# Patient Record
Sex: MALE | Race: BLACK OR AFRICAN AMERICAN | NOT HISPANIC OR LATINO | Employment: FULL TIME | URBAN - METROPOLITAN AREA
[De-identification: names, ages, dates, MRNs, and addresses within clinical notes are randomized per-mention and may not be internally consistent; named-entity substitution may affect disease eponyms.]

---

## 2019-06-21 ENCOUNTER — HOSPITAL ENCOUNTER (EMERGENCY)
Facility: HOSPITAL | Age: 39
Discharge: HOME | End: 2019-06-21
Attending: EMERGENCY MEDICINE
Payer: MEDICAID

## 2019-06-21 VITALS
DIASTOLIC BLOOD PRESSURE: 96 MMHG | TEMPERATURE: 99 F | OXYGEN SATURATION: 98 % | SYSTOLIC BLOOD PRESSURE: 198 MMHG | HEART RATE: 63 BPM | RESPIRATION RATE: 18 BRPM | HEIGHT: 74 IN | WEIGHT: 315 LBS | BODY MASS INDEX: 40.43 KG/M2

## 2019-06-21 DIAGNOSIS — I10 ESSENTIAL HYPERTENSION: ICD-10-CM

## 2019-06-21 DIAGNOSIS — M27.2 HARD PALATE ABSCESS: ICD-10-CM

## 2019-06-21 DIAGNOSIS — K08.89 DENTALGIA: Primary | ICD-10-CM

## 2019-06-21 LAB — POCT GLUCOSE: 178 MG/DL (ref 70–110)

## 2019-06-21 PROCEDURE — 10060 I&D ABSCESS SIMPLE/SINGLE: CPT

## 2019-06-21 PROCEDURE — 99283 EMERGENCY DEPT VISIT LOW MDM: CPT | Mod: 25

## 2019-06-21 PROCEDURE — 63600175 PHARM REV CODE 636 W HCPCS: Performed by: EMERGENCY MEDICINE

## 2019-06-21 PROCEDURE — 42000 DRAINAGE MOUTH ROOF LESION: CPT

## 2019-06-21 PROCEDURE — 25000003 PHARM REV CODE 250: Performed by: EMERGENCY MEDICINE

## 2019-06-21 RX ORDER — HYDROCHLOROTHIAZIDE 12.5 MG/1
12.5 CAPSULE ORAL
Status: ON HOLD | COMMUNITY
Start: 2019-06-05 | End: 2020-03-08 | Stop reason: HOSPADM

## 2019-06-21 RX ORDER — HYDROCODONE BITARTRATE AND ACETAMINOPHEN 10; 325 MG/1; MG/1
1 TABLET ORAL
Status: COMPLETED | OUTPATIENT
Start: 2019-06-21 | End: 2019-06-21

## 2019-06-21 RX ORDER — AMLODIPINE BESYLATE 5 MG/1
5 TABLET ORAL
COMMUNITY
Start: 2019-06-05 | End: 2020-11-27

## 2019-06-21 RX ORDER — CLINDAMYCIN HYDROCHLORIDE 150 MG/1
300 CAPSULE ORAL 4 TIMES DAILY
Qty: 56 CAPSULE | Refills: 0 | Status: SHIPPED | OUTPATIENT
Start: 2019-06-21 | End: 2019-06-28

## 2019-06-21 RX ORDER — HYDROCODONE BITARTRATE AND ACETAMINOPHEN 7.5; 325 MG/1; MG/1
1 TABLET ORAL EVERY 6 HOURS PRN
Qty: 10 TABLET | Refills: 0 | Status: SHIPPED | OUTPATIENT
Start: 2019-06-21 | End: 2021-05-19

## 2019-06-21 RX ADMIN — BENZOCAINE: 200 SPRAY DENTAL; ORAL; PERIODONTAL at 08:06

## 2019-06-21 RX ADMIN — HYDROCODONE BITARTRATE AND ACETAMINOPHEN 1 TABLET: 10; 325 TABLET ORAL at 07:06

## 2019-06-21 NOTE — ED NOTES
Pt reports 8/10 mouth pain due to abscess on roof of mouth - onset x 2 days. Pt denies fever, nausea, vomiting.     Patient identifiers verified and correct for Tee Shrestha.    HEENT: Abscess to roof of mouth - left side.   LOC: The patient is awake, alert and aware of environment with an appropriate affect, the patient is oriented x 3 and speaking appropriately.  APPEARANCE: Patient resting comfortably and in no acute distress, patient is clean and well groomed, patient's clothing is properly fastened.  SKIN: The skin is warm and dry, color consistent with ethnicity, patient has normal skin turgor and moist mucus membranes, skin intact, no breakdown or bruising noted.  MUSCULOSKELETAL: Patient moving all extremities spontaneously.  RESPIRATORY: Airway is open and patent, respirations are spontaneous.  CARDIAC: Patient has a normal rate, no periphreal edema noted, capillary refill < 3 seconds.  ABDOMEN: Soft and non tender to palpation.

## 2019-06-21 NOTE — ED PROVIDER NOTES
SCRIBE #1 NOTE: I, Corinne Mack, am scribing for, and in the presence of, Darwin Vee MD. I have scribed the entire note.      History      Chief Complaint   Patient presents with    Dental Pain     Pt reports pain to left side of mouth with abcess to roof of mouth x2 days.       Review of patient's allergies indicates:   Allergen Reactions    Pcn [penicillins] Itching        HPI   HPI    6/21/2019, 7:19 AM   History obtained from the patient      History of Present Illness: Tee Shrestha is a 39 y.o. male patient with PMHx of GERD and HTN who presents to the Emergency Department for L upper dental pain which onset gradually 2 days ago. Pt states that he has an abscess to the roof of his mouth and that the pain feels like it radiates up towards his nose. Symptoms are constant and moderate in severity. Pt's pain is worse with palpation. No mitigating factors reported. Associated sxs include L sided facial pain and L ear pain. Patient denies any fever, chills, congestion, sinus pain, cough, N/V/D, abd pain, back pain, neck pain, HA, and all other sxs at this time. No prior Tx reported. Pt's BP is 218/105 in the ED. Pt states that he has not taken his daily medications, including his BP medication, yet today. No further complaints or concerns at this time.         Arrival mode: Personal vehicle    PCP: Bear Walker MD       Past Medical History:  Past Medical History:   Diagnosis Date    Abscess     GERD (gastroesophageal reflux disease)     Hypertension        Past Surgical History:  History reviewed. No pertinent surgical history.      Family History:  Family History   Problem Relation Age of Onset    Hypertension Mother     Diabetes Mother     No Known Problems Father        Social History:  Social History     Tobacco Use    Smoking status: Current Every Day Smoker     Packs/day: 1.00     Types: Cigarettes    Smokeless tobacco: Never Used   Substance and Sexual Activity    Alcohol use: No     Drug use: No    Sexual activity: Unknown       ROS   Review of Systems   Constitutional: Negative for chills and fever.   HENT: Positive for dental problem (L upper pain) and ear pain (L). Negative for congestion, ear discharge, facial swelling, mouth sores, rhinorrhea, sinus pressure, sinus pain and sore throat.         (+) L sided facial pain   Respiratory: Negative for cough and shortness of breath.    Cardiovascular: Negative for chest pain and leg swelling.   Gastrointestinal: Negative for abdominal pain, diarrhea, nausea and vomiting.   Musculoskeletal: Negative for back pain, neck pain and neck stiffness.   Skin: Negative for rash and wound.   Neurological: Negative for dizziness, light-headedness, numbness and headaches.   All other systems reviewed and are negative.    Physical Exam      Initial Vitals   BP Pulse Resp Temp SpO2   06/21/19 0710 06/21/19 0710 06/21/19 0720 06/21/19 0710 06/21/19 0710   (!) 218/105 70 18 99 °F (37.2 °C) 97 %      MAP       --                 Physical Exam  Nursing Notes and Vital Signs Reviewed.  Constitutional: Patient is in no acute distress. Well-developed and well-nourished.  Head: Atraumatic. Normocephalic.  Eyes: PERRL. EOM intact. Conjunctivae are not pale. No scleral icterus.  ENT: Mucous membranes are moist. Oropharynx is clear and symmetric.    Mouth/Throat: No evident facial swelling. Patient handles secretions normally. Positive for dental caries. Negative for gingival edema. There is a 1x1 cm area of fluctuance and TTP to the L hard palate behind the L incisor. No evidence of periodontal or periapical abscess. No trismus.   Neck: Supple. Full ROM. No lymphadenopathy.  Cardiovascular: Regular rate. Regular rhythm. No murmurs, rubs, or gallops. Distal pulses are 2+ and symmetric.  Pulmonary/Chest: No respiratory distress. Clear to auscultation bilaterally. No wheezing or rales.  Abdominal: Soft and non-distended.  There is no tenderness.  No rebound, guarding,  "or rigidity.  Musculoskeletal: Moves all extremities. No obvious deformities. No edema.   Skin: Warm and dry.  Neurological:  Alert, awake, and appropriate.  Normal speech.  No acute focal neurological deficits are appreciated.  Psychiatric: Normal affect. Good eye contact. Appropriate in content.    ED Course    I & D - Incision and Drainage  Date/Time: 2019 8:55 AM  Performed by: Chema Tirado NP  Authorized by: Darwin Vee MD   Consent Done: Yes  Consent: Verbal consent obtained.  Risks and benefits: risks, benefits and alternatives were discussed  Consent given by: patient  Patient understanding: patient states understanding of the procedure being performed  Patient consent: the patient's understanding of the procedure matches consent given  Patient identity confirmed: , name and verbally with patient  Type: abscess  Body area: mouth  Location details: palate  Anesthesia: see MAR for details    Anesthesia:  Local anesthetic: benzocaine 20% mouth spray.  Patient sedated: no  Scalpel size: 10  Incision type: single straight  Complexity: simple  Drainage: pus and  purulent  Drainage amount: moderate  Wound treatment: incision,  drainage and  wound left open  Complications: No  Specimens: No  Implants: No  Patient tolerance: Patient tolerated the procedure well with no immediate complications        ED Vital Signs:  Vitals:    19 0710 19 0720 19 0943   BP: (!) 218/105 (!) 198/95 (!) 198/96   Pulse: 70 74 63   Resp:  18 18   Temp: 99 °F (37.2 °C)     SpO2: 97% 98% 98%   Weight: (!) 181 kg (399 lb 0.5 oz)     Height: 6' 2" (1.88 m)         Abnormal Lab Results:  Labs Reviewed   POCT GLUCOSE - Abnormal; Notable for the following components:       Result Value    POCT Glucose 178 (*)     All other components within normal limits        All Lab Results:  Results for orders placed or performed during the hospital encounter of 19   POCT glucose   Result Value Ref Range    POCT Glucose " 178 (H) 70 - 110 mg/dL       Imaging Results:  Imaging Results    None                 The Emergency Provider reviewed the vital signs and test results, which are outlined above.    ED Discussion     9:07 AM: Reassessed pt at this time. Pt is awake, alert, and in no distress. Discussed with pt all pertinent ED information and results. Discussed pt dx and plan of tx. Gave pt all f/u and return to the ED instructions. All questions and concerns were addressed at this time. Pt expresses understanding of information and instructions, and is comfortable with plan to discharge. Pt is stable for discharge.    I discussed with patient and/or family/caretaker that evaluation in the ED does not suggest any emergent or life threatening medical conditions requiring immediate intervention beyond what was provided in the ED, and I believe patient is safe for discharge.  Regardless, an unremarkable evaluation in the ED does not preclude the development or presence of a serious of life threatening condition. As such, patient was instructed to return immediately for any worsening or change in current symptoms.    I discussed wound care precautions with patient and/or family/caretaker; specifically that all wounds have risk of infection despite efforts to cleanse and debride the wound; and there is a risk of an occult foreign body (and thus increased risk of infection) despite a negative examination.  I discussed with patient need to return for any signs of infection, specifically redness, increased pain, fever, drainage of pus, or any concern, immediately.      ED Medication(s):  Medications   benzocaine 20 % mouth spray ( Mouth/Throat Given by Other 6/21/19 6930)   HYDROcodone-acetaminophen  mg per tablet 1 tablet (1 tablet Oral Given 6/21/19 1873)          Medication List      START taking these medications    clindamycin 150 MG capsule  Commonly known as:  CLEOCIN  Take 2 capsules (300 mg total) by mouth 4 (four) times  daily. for 7 days     HYDROcodone-acetaminophen 7.5-325 mg per tablet  Commonly known as:  NORCO  Take 1 tablet by mouth every 6 (six) hours as needed for Pain.        ASK your doctor about these medications    amLODIPine 5 MG tablet  Commonly known as:  NORVASC     cetirizine 10 MG tablet  Commonly known as:  ZYRTEC  Take 1 tablet (10 mg total) by mouth once daily.     hydroCHLOROthiazide 12.5 mg capsule  Commonly known as:  MICROZIDE     ketorolac 10 mg tablet  Commonly known as:  TORADOL  Take 1 tablet (10 mg total) by mouth every 6 (six) hours.           Where to Get Your Medications      You can get these medications from any pharmacy    Bring a paper prescription for each of these medications  · clindamycin 150 MG capsule  · HYDROcodone-acetaminophen 7.5-325 mg per tablet         Follow-up Information     Bear Walker MD. Schedule an appointment as soon as possible for a visit in 3 days.    Specialty:  Family Medicine  Why:  Follow-up with your primary care physician the next 2-3 days for recheck.  Return to the emergency department for any worsening signs or symptoms.  Contact information:  [address] [place] [address] [phone]                     Medical Decision Making    Medical Decision Making:   Clinical Tests:   Lab Tests: Ordered and Reviewed     Additional MDM:   Smoking Cessation: The patient is a smoker. The patient was counseled on smoking cessation for: 4 minutes. The patient was counseled on tobacco related  health complications.        Scribe Attestation:   Scribe #1: I performed the above scribed service and the documentation accurately describes the services I performed. I attest to the accuracy of the note 06/21/2019.    Attending:   Physician Attestation Statement for Scribe #1: I, Darwin Vee MD, personally performed the services described in this documentation, as scribed by Corinne Mack, in my presence, and it is both accurate and complete.          Clinical Impression        ICD-10-CM ICD-9-CM   1. Dentalgia K08.89 525.9   2. Hard palate abscess M27.2 526.4   3. Essential hypertension I10 401.9       Disposition:   Disposition: Discharged  Condition: Stable           Darwin Vee MD  06/22/19 1036

## 2019-09-17 ENCOUNTER — HOSPITAL ENCOUNTER (EMERGENCY)
Facility: HOSPITAL | Age: 39
Discharge: HOME | End: 2019-09-17
Attending: EMERGENCY MEDICINE
Payer: MEDICAID

## 2019-09-17 VITALS
RESPIRATION RATE: 18 BRPM | DIASTOLIC BLOOD PRESSURE: 92 MMHG | HEART RATE: 87 BPM | SYSTOLIC BLOOD PRESSURE: 166 MMHG | WEIGHT: 315 LBS | HEIGHT: 74 IN | OXYGEN SATURATION: 98 % | BODY MASS INDEX: 40.43 KG/M2 | TEMPERATURE: 98 F

## 2019-09-17 DIAGNOSIS — R31.9 HEMATURIA, UNSPECIFIED TYPE: Primary | ICD-10-CM

## 2019-09-17 LAB
ALBUMIN SERPL BCP-MCNC: 3.3 G/DL (ref 3.5–5.2)
ALP SERPL-CCNC: 77 U/L (ref 55–135)
ALT SERPL W/O P-5'-P-CCNC: 13 U/L (ref 10–44)
ANION GAP SERPL CALC-SCNC: 10 MMOL/L (ref 8–16)
AST SERPL-CCNC: 13 U/L (ref 10–40)
BASOPHILS # BLD AUTO: 0.02 K/UL (ref 0–0.2)
BASOPHILS NFR BLD: 0.2 % (ref 0–1.9)
BILIRUB SERPL-MCNC: 0.5 MG/DL (ref 0.1–1)
BUN SERPL-MCNC: 8 MG/DL (ref 6–20)
CALCIUM SERPL-MCNC: 9.7 MG/DL (ref 8.7–10.5)
CHLORIDE SERPL-SCNC: 106 MMOL/L (ref 95–110)
CO2 SERPL-SCNC: 25 MMOL/L (ref 23–29)
CREAT SERPL-MCNC: 1 MG/DL (ref 0.5–1.4)
DIFFERENTIAL METHOD: ABNORMAL
EOSINOPHIL # BLD AUTO: 0.3 K/UL (ref 0–0.5)
EOSINOPHIL NFR BLD: 2.8 % (ref 0–8)
ERYTHROCYTE [DISTWIDTH] IN BLOOD BY AUTOMATED COUNT: 15.9 % (ref 11.5–14.5)
EST. GFR  (AFRICAN AMERICAN): >60 ML/MIN/1.73 M^2
EST. GFR  (NON AFRICAN AMERICAN): >60 ML/MIN/1.73 M^2
GLUCOSE SERPL-MCNC: 116 MG/DL (ref 70–110)
HCT VFR BLD AUTO: 43.4 % (ref 40–54)
HGB BLD-MCNC: 14.1 G/DL (ref 14–18)
HIV 1+2 AB+HIV1 P24 AG SERPL QL IA: NEGATIVE
LYMPHOCYTES # BLD AUTO: 2 K/UL (ref 1–4.8)
LYMPHOCYTES NFR BLD: 19.7 % (ref 18–48)
MCH RBC QN AUTO: 28.5 PG (ref 27–31)
MCHC RBC AUTO-ENTMCNC: 32.5 G/DL (ref 32–36)
MCV RBC AUTO: 88 FL (ref 82–98)
MONOCYTES # BLD AUTO: 0.8 K/UL (ref 0.3–1)
MONOCYTES NFR BLD: 7.5 % (ref 4–15)
NEUTROPHILS # BLD AUTO: 7.1 K/UL (ref 1.8–7.7)
NEUTROPHILS NFR BLD: 70.5 % (ref 38–73)
PLATELET # BLD AUTO: 138 K/UL (ref 150–350)
PMV BLD AUTO: ABNORMAL FL (ref 9.2–12.9)
POTASSIUM SERPL-SCNC: 3.7 MMOL/L (ref 3.5–5.1)
PROT SERPL-MCNC: 7.6 G/DL (ref 6–8.4)
RBC # BLD AUTO: 4.94 M/UL (ref 4.6–6.2)
SODIUM SERPL-SCNC: 141 MMOL/L (ref 136–145)
WBC # BLD AUTO: 10.17 K/UL (ref 3.9–12.7)

## 2019-09-17 PROCEDURE — 80053 COMPREHEN METABOLIC PANEL: CPT

## 2019-09-17 PROCEDURE — 99284 EMERGENCY DEPT VISIT MOD MDM: CPT | Mod: 25

## 2019-09-17 PROCEDURE — 85025 COMPLETE CBC W/AUTO DIFF WBC: CPT

## 2019-09-17 PROCEDURE — 86703 HIV-1/HIV-2 1 RESULT ANTBDY: CPT

## 2019-09-17 NOTE — ED PROVIDER NOTES
Encounter Date: 9/17/2019       History     Chief Complaint   Patient presents with    Hematuria     reports noticed bright red blood in urine a few days ago, denies any kidney or urinary pain, has since stopped seeing the blood      39 year old male with complaint of blood in urine since yesterday.  Denies pain at present. No fever or chills. No vomiting. No other complaints.         Review of patient's allergies indicates:   Allergen Reactions    Pcn [penicillins] Itching     Past Medical History:   Diagnosis Date    Abscess     GERD (gastroesophageal reflux disease)     Hypertension      History reviewed. No pertinent surgical history.  Family History   Problem Relation Age of Onset    Hypertension Mother     Diabetes Mother     No Known Problems Father      Social History     Tobacco Use    Smoking status: Current Every Day Smoker     Packs/day: 1.00     Types: Cigarettes    Smokeless tobacco: Never Used   Substance Use Topics    Alcohol use: No    Drug use: No     Review of Systems   Constitutional: Negative for fever.   HENT: Negative for sore throat.    Respiratory: Negative for shortness of breath.    Cardiovascular: Negative for chest pain.   Gastrointestinal: Negative for nausea.   Genitourinary: Positive for hematuria. Negative for dysuria.   Musculoskeletal: Negative for back pain.   Skin: Negative for rash.   Neurological: Negative for weakness.   Hematological: Does not bruise/bleed easily.       Physical Exam     Initial Vitals [09/17/19 1429]   BP Pulse Resp Temp SpO2   (!) 166/92 87 18 98.3 °F (36.8 °C) 98 %      MAP       --         Physical Exam    Nursing note and vitals reviewed.  Constitutional: He appears well-developed and well-nourished.   HENT:   Head: Normocephalic and atraumatic.   Eyes: Conjunctivae are normal. Pupils are equal, round, and reactive to light.   Neck: Normal range of motion. Neck supple.   Cardiovascular: Normal rate, regular rhythm, normal heart sounds and  intact distal pulses.   Pulmonary/Chest: Breath sounds normal.   Abdominal: Soft. There is no rebound and no guarding.   Musculoskeletal: Normal range of motion.   Neurological: He is alert.   Skin: Skin is warm and dry.   Psychiatric: He has a normal mood and affect. His behavior is normal. Thought content normal.         ED Course   Procedures  Labs Reviewed   COMPREHENSIVE METABOLIC PANEL - Abnormal; Notable for the following components:       Result Value    Glucose 116 (*)     Albumin 3.3 (*)     All other components within normal limits   CBC W/ AUTO DIFFERENTIAL   URINALYSIS   HIV 1 / 2 ANTIBODY          Imaging Results          CT Renal Stone Study Abd Pelvis WO (Final result)  Result time 09/17/19 15:32:17    Final result by Andrés Cox MD (09/17/19 15:32:17)                 Impression:      Source of patient's hematuria is not identified.    All CT scans at this facility use dose modulation, iterative reconstruction, and/or weight based dosing when appropriate to reduce radiation dose to as low as reasonable achievable.      Electronically signed by: Andrés Cox MD  Date:    09/17/2019  Time:    15:32             Narrative:    EXAMINATION:  CT RENAL STONE STUDY ABD PELVIS WO    CLINICAL HISTORY:  Hematuria;    TECHNIQUE:  Low dose axial images, sagittal and coronal reformations were obtained from the lung bases to the pubic symphysis.    COMPARISON:  08/03/2016    FINDINGS:  Heart: Normal size. No effusion.    Lung Bases: Clear.    Liver: Normal size and attenuation. No focal lesions.    Gallbladder: No calcified gallstones.    Bile Ducts: No dilatation.    Pancreas: No obvious mass. No peripancreatic fat stranding.    Spleen: Normal.    Adrenals: Normal.    Kidneys/Ureters: No mass, hydroureteronephrosis, or nephroureterolithiasis.    Bladder: No wall thickening.    Reproductive organs: Uterus and ovaries are not seen.    GI Tract/Mesentery: No evidence of bowel obstruction or inflammation.  Normal  appendix.    Peritoneal Space: No ascites or free air.    Retroperitoneum: No significant adenopathy.    Abdominal wall: Normal.    Vasculature: No aneurysm. Aorta demonstrates atherosclerotic disease.    Bones: No acute fracture. No suspicious lytic or sclerotic lesions.                                     Labs Reviewed   COMPREHENSIVE METABOLIC PANEL - Abnormal; Notable for the following components:       Result Value    Glucose 116 (*)     Albumin 3.3 (*)     All other components within normal limits   CBC W/ AUTO DIFFERENTIAL   URINALYSIS   HIV 1 / 2 ANTIBODY     Imaging Results          CT Renal Stone Study Abd Pelvis WO (Final result)  Result time 09/17/19 15:32:17    Final result by Andrés Cox MD (09/17/19 15:32:17)                 Impression:      Source of patient's hematuria is not identified.    All CT scans at this facility use dose modulation, iterative reconstruction, and/or weight based dosing when appropriate to reduce radiation dose to as low as reasonable achievable.      Electronically signed by: Andrés Cox MD  Date:    09/17/2019  Time:    15:32             Narrative:    EXAMINATION:  CT RENAL STONE STUDY ABD PELVIS WO    CLINICAL HISTORY:  Hematuria;    TECHNIQUE:  Low dose axial images, sagittal and coronal reformations were obtained from the lung bases to the pubic symphysis.    COMPARISON:  08/03/2016    FINDINGS:  Heart: Normal size. No effusion.    Lung Bases: Clear.    Liver: Normal size and attenuation. No focal lesions.    Gallbladder: No calcified gallstones.    Bile Ducts: No dilatation.    Pancreas: No obvious mass. No peripancreatic fat stranding.    Spleen: Normal.    Adrenals: Normal.    Kidneys/Ureters: No mass, hydroureteronephrosis, or nephroureterolithiasis.    Bladder: No wall thickening.    Reproductive organs: Uterus and ovaries are not seen.    GI Tract/Mesentery: No evidence of bowel obstruction or inflammation.  Normal appendix.    Peritoneal Space: No ascites or  free air.    Retroperitoneum: No significant adenopathy.    Abdominal wall: Normal.    Vasculature: No aneurysm. Aorta demonstrates atherosclerotic disease.    Bones: No acute fracture. No suspicious lytic or sclerotic lesions.                              4:47 PM  Pt does not want to wait for labs any longer, pt reports that he will see his PCP in 2 days and he can check labs at appointment, pt told that if CBC or u/a abnormal, no one will notify him until he is seen by PCP, pt understands and does not want to wait                    Clinical Impression:       ICD-10-CM ICD-9-CM   1. Hematuria, unspecified type R31.9 599.70                                Chema Tirado NP  09/17/19 1646

## 2020-03-07 ENCOUNTER — HOSPITAL ENCOUNTER (OUTPATIENT)
Facility: HOSPITAL | Age: 40
Discharge: HOME | End: 2020-03-08
Attending: EMERGENCY MEDICINE | Admitting: INTERNAL MEDICINE
Payer: MEDICAID

## 2020-03-07 DIAGNOSIS — E86.1 INTRAVASCULAR VOLUME DEPLETION: ICD-10-CM

## 2020-03-07 DIAGNOSIS — E11.9 NEW ONSET TYPE 2 DIABETES MELLITUS: Primary | ICD-10-CM

## 2020-03-07 DIAGNOSIS — I10 HYPERTENSION: ICD-10-CM

## 2020-03-07 DIAGNOSIS — R73.9 HYPERGLYCEMIA: ICD-10-CM

## 2020-03-07 DIAGNOSIS — E66.01 MORBID OBESITY: ICD-10-CM

## 2020-03-07 PROBLEM — Z72.0 TOBACCO USE: Status: ACTIVE | Noted: 2020-03-07

## 2020-03-07 LAB
ALBUMIN SERPL BCP-MCNC: 3.9 G/DL (ref 3.5–5.2)
ALLENS TEST: ABNORMAL
ALP SERPL-CCNC: 91 U/L (ref 55–135)
ALT SERPL W/O P-5'-P-CCNC: 19 U/L (ref 10–44)
AMPHET+METHAMPHET UR QL: NEGATIVE
ANION GAP SERPL CALC-SCNC: 18 MMOL/L (ref 8–16)
AST SERPL-CCNC: 20 U/L (ref 10–40)
B-OH-BUTYR BLD STRIP-SCNC: 0.6 MMOL/L (ref 0–0.5)
BACTERIA #/AREA URNS HPF: NORMAL /HPF
BARBITURATES UR QL SCN>200 NG/ML: NEGATIVE
BASOPHILS # BLD AUTO: 0.02 K/UL (ref 0–0.2)
BASOPHILS NFR BLD: 0.2 % (ref 0–1.9)
BENZODIAZ UR QL SCN>200 NG/ML: NEGATIVE
BILIRUB SERPL-MCNC: 0.7 MG/DL (ref 0.1–1)
BILIRUB UR QL STRIP: NEGATIVE
BUN SERPL-MCNC: 9 MG/DL (ref 6–20)
BZE UR QL SCN: NEGATIVE
CALCIUM SERPL-MCNC: 9.2 MG/DL (ref 8.7–10.5)
CANNABINOIDS UR QL SCN: NEGATIVE
CHLORIDE SERPL-SCNC: 99 MMOL/L (ref 95–110)
CLARITY UR: CLEAR
CO2 SERPL-SCNC: 17 MMOL/L (ref 23–29)
COLOR UR: YELLOW
CREAT SERPL-MCNC: 1.2 MG/DL (ref 0.5–1.4)
CREAT UR-MCNC: 35.5 MG/DL (ref 23–375)
DELSYS: ABNORMAL
DIFFERENTIAL METHOD: ABNORMAL
EOSINOPHIL # BLD AUTO: 0.2 K/UL (ref 0–0.5)
EOSINOPHIL NFR BLD: 1.8 % (ref 0–8)
ERYTHROCYTE [DISTWIDTH] IN BLOOD BY AUTOMATED COUNT: 14.6 % (ref 11.5–14.5)
EST. GFR  (AFRICAN AMERICAN): >60 ML/MIN/1.73 M^2
EST. GFR  (NON AFRICAN AMERICAN): >60 ML/MIN/1.73 M^2
GLUCOSE SERPL-MCNC: 426 MG/DL (ref 70–110)
GLUCOSE SERPL-MCNC: 512 MG/DL (ref 70–110)
GLUCOSE UR QL STRIP: ABNORMAL
HCO3 UR-SCNC: 22.1 MMOL/L (ref 24–28)
HCT VFR BLD AUTO: 44.5 % (ref 40–54)
HCT VFR BLD CALC: 44 %PCV (ref 36–54)
HGB BLD-MCNC: 14.3 G/DL (ref 14–18)
HGB UR QL STRIP: NEGATIVE
IMM GRANULOCYTES # BLD AUTO: 0.12 K/UL (ref 0–0.04)
IMM GRANULOCYTES NFR BLD AUTO: 1.1 % (ref 0–0.5)
KETONES UR QL STRIP: ABNORMAL
LEUKOCYTE ESTERASE UR QL STRIP: NEGATIVE
LYMPHOCYTES # BLD AUTO: 2.6 K/UL (ref 1–4.8)
LYMPHOCYTES NFR BLD: 24.7 % (ref 18–48)
MAGNESIUM SERPL-MCNC: 1.7 MG/DL (ref 1.6–2.6)
MCH RBC QN AUTO: 28.1 PG (ref 27–31)
MCHC RBC AUTO-ENTMCNC: 32.1 G/DL (ref 32–36)
MCV RBC AUTO: 88 FL (ref 82–98)
METHADONE UR QL SCN>300 NG/ML: NEGATIVE
MICROSCOPIC COMMENT: NORMAL
MONOCYTES # BLD AUTO: 0.7 K/UL (ref 0.3–1)
MONOCYTES NFR BLD: 6.1 % (ref 4–15)
NEUTROPHILS # BLD AUTO: 7.1 K/UL (ref 1.8–7.7)
NEUTROPHILS NFR BLD: 66.1 % (ref 38–73)
NITRITE UR QL STRIP: NEGATIVE
NRBC BLD-RTO: 0 /100 WBC
OPIATES UR QL SCN: NEGATIVE
PCO2 BLDA: 39.2 MMHG (ref 35–45)
PCP UR QL SCN>25 NG/ML: NEGATIVE
PH SMN: 7.36 [PH] (ref 7.35–7.45)
PH UR STRIP: 6 [PH] (ref 5–8)
PLATELET # BLD AUTO: 147 K/UL (ref 150–350)
PMV BLD AUTO: ABNORMAL FL (ref 9.2–12.9)
PO2 BLDA: 67 MMHG (ref 80–100)
POC BE: -3 MMOL/L
POC IONIZED CALCIUM: 1.24 MMOL/L (ref 1.06–1.42)
POC SATURATED O2: 92 % (ref 95–100)
POCT GLUCOSE: 235 MG/DL (ref 70–110)
POCT GLUCOSE: 432 MG/DL (ref 70–110)
POTASSIUM BLD-SCNC: 3.4 MMOL/L (ref 3.5–5.1)
POTASSIUM SERPL-SCNC: 3.8 MMOL/L (ref 3.5–5.1)
PROT SERPL-MCNC: 8.2 G/DL (ref 6–8.4)
PROT UR QL STRIP: NEGATIVE
RBC # BLD AUTO: 5.08 M/UL (ref 4.6–6.2)
SAMPLE: ABNORMAL
SITE: ABNORMAL
SODIUM BLD-SCNC: 136 MMOL/L (ref 136–145)
SODIUM SERPL-SCNC: 134 MMOL/L (ref 136–145)
SP GR UR STRIP: 1.01 (ref 1–1.03)
TOXICOLOGY INFORMATION: NORMAL
URN SPEC COLLECT METH UR: ABNORMAL
UROBILINOGEN UR STRIP-ACNC: NEGATIVE EU/DL
WBC # BLD AUTO: 10.66 K/UL (ref 3.9–12.7)
YEAST URNS QL MICRO: NORMAL

## 2020-03-07 PROCEDURE — 96374 THER/PROPH/DIAG INJ IV PUSH: CPT

## 2020-03-07 PROCEDURE — 99291 CRITICAL CARE FIRST HOUR: CPT

## 2020-03-07 PROCEDURE — 84295 ASSAY OF SERUM SODIUM: CPT

## 2020-03-07 PROCEDURE — 36600 WITHDRAWAL OF ARTERIAL BLOOD: CPT

## 2020-03-07 PROCEDURE — 25000003 PHARM REV CODE 250: Performed by: INTERNAL MEDICINE

## 2020-03-07 PROCEDURE — 93010 EKG 12-LEAD: ICD-10-PCS | Mod: ,,, | Performed by: INTERNAL MEDICINE

## 2020-03-07 PROCEDURE — 93005 ELECTROCARDIOGRAM TRACING: CPT | Performed by: INTERNAL MEDICINE

## 2020-03-07 PROCEDURE — 82010 KETONE BODYS QUAN: CPT

## 2020-03-07 PROCEDURE — 84132 ASSAY OF SERUM POTASSIUM: CPT

## 2020-03-07 PROCEDURE — 96372 THER/PROPH/DIAG INJ SC/IM: CPT | Mod: 59 | Performed by: EMERGENCY MEDICINE

## 2020-03-07 PROCEDURE — 85014 HEMATOCRIT: CPT

## 2020-03-07 PROCEDURE — 63600175 PHARM REV CODE 636 W HCPCS: Performed by: INTERNAL MEDICINE

## 2020-03-07 PROCEDURE — 99900035 HC TECH TIME PER 15 MIN (STAT)

## 2020-03-07 PROCEDURE — 85025 COMPLETE CBC W/AUTO DIFF WBC: CPT

## 2020-03-07 PROCEDURE — 96361 HYDRATE IV INFUSION ADD-ON: CPT | Performed by: EMERGENCY MEDICINE

## 2020-03-07 PROCEDURE — 82962 GLUCOSE BLOOD TEST: CPT

## 2020-03-07 PROCEDURE — 96372 THER/PROPH/DIAG INJ SC/IM: CPT | Mod: 59

## 2020-03-07 PROCEDURE — 80307 DRUG TEST PRSMV CHEM ANLYZR: CPT

## 2020-03-07 PROCEDURE — 82330 ASSAY OF CALCIUM: CPT

## 2020-03-07 PROCEDURE — 93005 ELECTROCARDIOGRAM TRACING: CPT

## 2020-03-07 PROCEDURE — 93010 ELECTROCARDIOGRAM REPORT: CPT | Mod: ,,, | Performed by: INTERNAL MEDICINE

## 2020-03-07 PROCEDURE — G0378 HOSPITAL OBSERVATION PER HR: HCPCS

## 2020-03-07 PROCEDURE — 96361 HYDRATE IV INFUSION ADD-ON: CPT

## 2020-03-07 PROCEDURE — 81000 URINALYSIS NONAUTO W/SCOPE: CPT | Mod: 59

## 2020-03-07 PROCEDURE — 80053 COMPREHEN METABOLIC PANEL: CPT

## 2020-03-07 PROCEDURE — 63600175 PHARM REV CODE 636 W HCPCS: Performed by: EMERGENCY MEDICINE

## 2020-03-07 PROCEDURE — 83735 ASSAY OF MAGNESIUM: CPT

## 2020-03-07 PROCEDURE — 82803 BLOOD GASES ANY COMBINATION: CPT

## 2020-03-07 PROCEDURE — 83036 HEMOGLOBIN GLYCOSYLATED A1C: CPT

## 2020-03-07 RX ORDER — ONDANSETRON 2 MG/ML
4 INJECTION INTRAMUSCULAR; INTRAVENOUS EVERY 8 HOURS PRN
Status: DISCONTINUED | OUTPATIENT
Start: 2020-03-07 | End: 2020-03-08 | Stop reason: HOSPADM

## 2020-03-07 RX ORDER — HYDROCHLOROTHIAZIDE 12.5 MG/1
12.5 TABLET ORAL DAILY
Status: DISCONTINUED | OUTPATIENT
Start: 2020-03-08 | End: 2020-03-08 | Stop reason: HOSPADM

## 2020-03-07 RX ORDER — AMLODIPINE BESYLATE 5 MG/1
5 TABLET ORAL DAILY
Status: DISCONTINUED | OUTPATIENT
Start: 2020-03-08 | End: 2020-03-08 | Stop reason: HOSPADM

## 2020-03-07 RX ORDER — SODIUM CHLORIDE 9 MG/ML
INJECTION, SOLUTION INTRAVENOUS CONTINUOUS
Status: DISCONTINUED | OUTPATIENT
Start: 2020-03-07 | End: 2020-03-08 | Stop reason: HOSPADM

## 2020-03-07 RX ORDER — INSULIN ASPART 100 [IU]/ML
1-10 INJECTION, SOLUTION INTRAVENOUS; SUBCUTANEOUS
Status: DISCONTINUED | OUTPATIENT
Start: 2020-03-07 | End: 2020-03-08 | Stop reason: HOSPADM

## 2020-03-07 RX ORDER — IBUPROFEN 200 MG
1 TABLET ORAL DAILY
Status: DISCONTINUED | OUTPATIENT
Start: 2020-03-08 | End: 2020-03-08 | Stop reason: HOSPADM

## 2020-03-07 RX ORDER — GUAIFENESIN 100 MG/5ML
200 SOLUTION ORAL EVERY 4 HOURS PRN
Status: DISCONTINUED | OUTPATIENT
Start: 2020-03-07 | End: 2020-03-08 | Stop reason: HOSPADM

## 2020-03-07 RX ORDER — DIPHENHYDRAMINE HCL 25 MG
25 CAPSULE ORAL EVERY 6 HOURS PRN
Status: DISCONTINUED | OUTPATIENT
Start: 2020-03-07 | End: 2020-03-08 | Stop reason: HOSPADM

## 2020-03-07 RX ORDER — DEXTROSE MONOHYDRATE 100 MG/ML
12.5 INJECTION, SOLUTION INTRAVENOUS
Status: DISCONTINUED | OUTPATIENT
Start: 2020-03-07 | End: 2020-03-08 | Stop reason: HOSPADM

## 2020-03-07 RX ORDER — MAG HYDROX/ALUMINUM HYD/SIMETH 200-200-20
30 SUSPENSION, ORAL (FINAL DOSE FORM) ORAL EVERY 6 HOURS PRN
Status: DISCONTINUED | OUTPATIENT
Start: 2020-03-07 | End: 2020-03-08 | Stop reason: HOSPADM

## 2020-03-07 RX ORDER — IBUPROFEN 200 MG
24 TABLET ORAL
Status: DISCONTINUED | OUTPATIENT
Start: 2020-03-07 | End: 2020-03-08 | Stop reason: HOSPADM

## 2020-03-07 RX ORDER — GLUCAGON 1 MG
1 KIT INJECTION
Status: DISCONTINUED | OUTPATIENT
Start: 2020-03-07 | End: 2020-03-08 | Stop reason: HOSPADM

## 2020-03-07 RX ORDER — ENOXAPARIN SODIUM 100 MG/ML
40 INJECTION SUBCUTANEOUS EVERY 24 HOURS
Status: DISCONTINUED | OUTPATIENT
Start: 2020-03-07 | End: 2020-03-08 | Stop reason: HOSPADM

## 2020-03-07 RX ORDER — IPRATROPIUM BROMIDE AND ALBUTEROL SULFATE 2.5; .5 MG/3ML; MG/3ML
3 SOLUTION RESPIRATORY (INHALATION) EVERY 4 HOURS PRN
Status: DISCONTINUED | OUTPATIENT
Start: 2020-03-07 | End: 2020-03-08 | Stop reason: HOSPADM

## 2020-03-07 RX ORDER — IBUPROFEN 200 MG
16 TABLET ORAL
Status: DISCONTINUED | OUTPATIENT
Start: 2020-03-07 | End: 2020-03-08 | Stop reason: HOSPADM

## 2020-03-07 RX ORDER — HYDRALAZINE HYDROCHLORIDE 20 MG/ML
10 INJECTION INTRAMUSCULAR; INTRAVENOUS EVERY 6 HOURS PRN
Status: DISCONTINUED | OUTPATIENT
Start: 2020-03-07 | End: 2020-03-08 | Stop reason: HOSPADM

## 2020-03-07 RX ORDER — DEXTROSE MONOHYDRATE 100 MG/ML
25 INJECTION, SOLUTION INTRAVENOUS
Status: DISCONTINUED | OUTPATIENT
Start: 2020-03-07 | End: 2020-03-08 | Stop reason: HOSPADM

## 2020-03-07 RX ORDER — LISINOPRIL 20 MG/1
20 TABLET ORAL DAILY
Status: DISCONTINUED | OUTPATIENT
Start: 2020-03-07 | End: 2020-03-08 | Stop reason: HOSPADM

## 2020-03-07 RX ADMIN — ENOXAPARIN SODIUM 40 MG: 100 INJECTION SUBCUTANEOUS at 10:03

## 2020-03-07 RX ADMIN — SODIUM CHLORIDE 2000 ML: 0.9 INJECTION, SOLUTION INTRAVENOUS at 07:03

## 2020-03-07 RX ADMIN — INSULIN HUMAN 10 UNITS: 100 INJECTION, SOLUTION PARENTERAL at 07:03

## 2020-03-07 RX ADMIN — SODIUM CHLORIDE: 0.9 INJECTION, SOLUTION INTRAVENOUS at 10:03

## 2020-03-07 RX ADMIN — LISINOPRIL 20 MG: 20 TABLET ORAL at 10:03

## 2020-03-08 VITALS
SYSTOLIC BLOOD PRESSURE: 113 MMHG | DIASTOLIC BLOOD PRESSURE: 61 MMHG | OXYGEN SATURATION: 97 % | TEMPERATURE: 98 F | HEART RATE: 66 BPM | HEIGHT: 74 IN | RESPIRATION RATE: 18 BRPM | BODY MASS INDEX: 40.43 KG/M2 | WEIGHT: 315 LBS

## 2020-03-08 LAB
ALBUMIN SERPL BCP-MCNC: 3.2 G/DL (ref 3.5–5.2)
ALP SERPL-CCNC: 72 U/L (ref 55–135)
ALT SERPL W/O P-5'-P-CCNC: 16 U/L (ref 10–44)
ANION GAP SERPL CALC-SCNC: 8 MMOL/L (ref 8–16)
AST SERPL-CCNC: 16 U/L (ref 10–40)
BASOPHILS # BLD AUTO: 0.03 K/UL (ref 0–0.2)
BASOPHILS NFR BLD: 0.4 % (ref 0–1.9)
BILIRUB SERPL-MCNC: 0.4 MG/DL (ref 0.1–1)
BUN SERPL-MCNC: 8 MG/DL (ref 6–20)
CALCIUM SERPL-MCNC: 8 MG/DL (ref 8.7–10.5)
CHLORIDE SERPL-SCNC: 105 MMOL/L (ref 95–110)
CHOLEST SERPL-MCNC: 200 MG/DL (ref 120–199)
CHOLEST/HDLC SERPL: 7.4 {RATIO} (ref 2–5)
CO2 SERPL-SCNC: 22 MMOL/L (ref 23–29)
CREAT SERPL-MCNC: 0.8 MG/DL (ref 0.5–1.4)
DIFFERENTIAL METHOD: ABNORMAL
EOSINOPHIL # BLD AUTO: 0.2 K/UL (ref 0–0.5)
EOSINOPHIL NFR BLD: 2.3 % (ref 0–8)
ERYTHROCYTE [DISTWIDTH] IN BLOOD BY AUTOMATED COUNT: 14.6 % (ref 11.5–14.5)
EST. GFR  (AFRICAN AMERICAN): >60 ML/MIN/1.73 M^2
EST. GFR  (NON AFRICAN AMERICAN): >60 ML/MIN/1.73 M^2
ESTIMATED AVG GLUCOSE: 258 MG/DL (ref 68–131)
GLUCOSE SERPL-MCNC: 287 MG/DL (ref 70–110)
HBA1C MFR BLD HPLC: 10.6 % (ref 4–5.6)
HCT VFR BLD AUTO: 39.7 % (ref 40–54)
HDLC SERPL-MCNC: 27 MG/DL (ref 40–75)
HDLC SERPL: 13.5 % (ref 20–50)
HGB BLD-MCNC: 12.7 G/DL (ref 14–18)
IMM GRANULOCYTES # BLD AUTO: 0.09 K/UL (ref 0–0.04)
IMM GRANULOCYTES NFR BLD AUTO: 1.1 % (ref 0–0.5)
LDLC SERPL CALC-MCNC: 145.4 MG/DL (ref 63–159)
LYMPHOCYTES # BLD AUTO: 2.2 K/UL (ref 1–4.8)
LYMPHOCYTES NFR BLD: 27.7 % (ref 18–48)
MAGNESIUM SERPL-MCNC: 1.7 MG/DL (ref 1.6–2.6)
MCH RBC QN AUTO: 28.3 PG (ref 27–31)
MCHC RBC AUTO-ENTMCNC: 32 G/DL (ref 32–36)
MCV RBC AUTO: 89 FL (ref 82–98)
MONOCYTES # BLD AUTO: 0.5 K/UL (ref 0.3–1)
MONOCYTES NFR BLD: 5.9 % (ref 4–15)
NEUTROPHILS # BLD AUTO: 5 K/UL (ref 1.8–7.7)
NEUTROPHILS NFR BLD: 62.6 % (ref 38–73)
NONHDLC SERPL-MCNC: 173 MG/DL
NRBC BLD-RTO: 0 /100 WBC
PHOSPHATE SERPL-MCNC: 2.5 MG/DL (ref 2.7–4.5)
PLATELET # BLD AUTO: 125 K/UL (ref 150–350)
PMV BLD AUTO: ABNORMAL FL (ref 9.2–12.9)
POCT GLUCOSE: 276 MG/DL (ref 70–110)
POTASSIUM SERPL-SCNC: 3.5 MMOL/L (ref 3.5–5.1)
PROT SERPL-MCNC: 6.5 G/DL (ref 6–8.4)
RBC # BLD AUTO: 4.48 M/UL (ref 4.6–6.2)
SODIUM SERPL-SCNC: 135 MMOL/L (ref 136–145)
TRIGL SERPL-MCNC: 138 MG/DL (ref 30–150)
WBC # BLD AUTO: 7.93 K/UL (ref 3.9–12.7)

## 2020-03-08 PROCEDURE — G0378 HOSPITAL OBSERVATION PER HR: HCPCS

## 2020-03-08 PROCEDURE — 96361 HYDRATE IV INFUSION ADD-ON: CPT | Performed by: EMERGENCY MEDICINE

## 2020-03-08 PROCEDURE — 80061 LIPID PANEL: CPT

## 2020-03-08 PROCEDURE — C9399 UNCLASSIFIED DRUGS OR BIOLOG: HCPCS | Performed by: FAMILY MEDICINE

## 2020-03-08 PROCEDURE — 80053 COMPREHEN METABOLIC PANEL: CPT

## 2020-03-08 PROCEDURE — 63600175 PHARM REV CODE 636 W HCPCS: Performed by: FAMILY MEDICINE

## 2020-03-08 PROCEDURE — 63600175 PHARM REV CODE 636 W HCPCS: Performed by: INTERNAL MEDICINE

## 2020-03-08 PROCEDURE — 84100 ASSAY OF PHOSPHORUS: CPT

## 2020-03-08 PROCEDURE — 96372 THER/PROPH/DIAG INJ SC/IM: CPT | Mod: 59 | Performed by: EMERGENCY MEDICINE

## 2020-03-08 PROCEDURE — 25000003 PHARM REV CODE 250: Performed by: INTERNAL MEDICINE

## 2020-03-08 PROCEDURE — 85025 COMPLETE CBC W/AUTO DIFF WBC: CPT

## 2020-03-08 PROCEDURE — 36415 COLL VENOUS BLD VENIPUNCTURE: CPT

## 2020-03-08 PROCEDURE — 83735 ASSAY OF MAGNESIUM: CPT

## 2020-03-08 RX ORDER — METFORMIN HYDROCHLORIDE 1000 MG/1
1000 TABLET ORAL 2 TIMES DAILY WITH MEALS
Qty: 180 TABLET | Refills: 3 | Status: SHIPPED | OUTPATIENT
Start: 2020-03-08 | End: 2020-03-20 | Stop reason: SDUPTHER

## 2020-03-08 RX ORDER — LOSARTAN POTASSIUM 25 MG/1
25 TABLET ORAL DAILY
Qty: 30 TABLET | Refills: 1 | Status: SHIPPED | OUTPATIENT
Start: 2020-03-08 | End: 2020-11-10

## 2020-03-08 RX ORDER — INSULIN PUMP SYRINGE, 3 ML
EACH MISCELLANEOUS
Qty: 1 EACH | Refills: 0 | Status: SHIPPED | OUTPATIENT
Start: 2020-03-08 | End: 2020-03-09

## 2020-03-08 RX ADMIN — SODIUM CHLORIDE: 0.9 INJECTION, SOLUTION INTRAVENOUS at 06:03

## 2020-03-08 RX ADMIN — LISINOPRIL 20 MG: 20 TABLET ORAL at 09:03

## 2020-03-08 RX ADMIN — INSULIN ASPART 6 UNITS: 100 INJECTION, SOLUTION INTRAVENOUS; SUBCUTANEOUS at 06:03

## 2020-03-08 RX ADMIN — AMLODIPINE BESYLATE 5 MG: 5 TABLET ORAL at 09:03

## 2020-03-08 RX ADMIN — INSULIN DETEMIR 20 UNITS: 100 INJECTION, SOLUTION SUBCUTANEOUS at 09:03

## 2020-03-08 RX ADMIN — HYDROCHLOROTHIAZIDE 12.5 MG: 12.5 TABLET ORAL at 09:03

## 2020-03-08 NOTE — ASSESSMENT & PLAN NOTE
Polyuria, polydipsia, polyphagia for the past 1-2 weeks.  Blood sugar 512, AG18, CO2 18.  Received 2 L normal saline bolus in the ED.  Received 20 units regular insulin in the ED.  Admit for new onset diabetes.  Diabetic education in a.m..  Check hemoglobin A1c and lipid profile.  Counseled about lifestyle modifications and complications of diabetic retinopathy, nephropathy, neuropathy.  Continue normal saline at 150 cc/hour.

## 2020-03-08 NOTE — ASSESSMENT & PLAN NOTE
Continue home dose amlodipine.  Will add ACE I for nephro protective properties in setting of diabetes.

## 2020-03-08 NOTE — NURSING
Discharge instructions reviewed with pt.   PIV intact on removal.   Pt had no tele monitor ordered.  Informed pt that prescriptions were sent to pharmacy of choice.  Also informed pt that an appt with the diabetic educator is scheduled for tomorrow.   Pt verbalized understanding.   Pt had no further questions.  Instructed to call for a wheelchair when dressed and ready for d/c.

## 2020-03-08 NOTE — ED PROVIDER NOTES
SCRIBE #1 NOTE: I, Nayan Rider, am scribing for, and in the presence of, Nishi Armas MD. I have scribed the entire note.       History     Chief Complaint   Patient presents with    Numbness     pt c/o blurred vision and R-sided numbness that began x2 nights ago     Review of patient's allergies indicates:   Allergen Reactions    Hydrocodone-acetaminophen Hives and Itching         History of Present Illness     HPI    3/7/2020, 6:57 PM  History obtained from the patient      History of Present Illness: Tee Shrestha is a 39 y.o. male patient with a history of hypertension who presents to the Emergency Department for evaluation of right sided numbness which onset 3-4 days ago. Symptoms are constant and moderate in severity. No mitigating or exacerbating factors reported. Associated sxs include polydipsia, polyuria, blurred vision and dry mouth. Patient denies any extremity weakness, confusion, slurred speech, facial droop, fever, and all other sxs at this time. Prior Tx includes none. No further complaints or concerns at this time.       Arrival mode: Personal transportation     PCP: Bear Walker MD      Past Medical History:  Past Medical History:   Diagnosis Date    Abscess     GERD (gastroesophageal reflux disease)     Hypertension        Past Surgical History:  History reviewed. No pertinent surgical history.      Family History:  Family History   Problem Relation Age of Onset    Hypertension Mother     Diabetes Mother     No Known Problems Father        Social History:   Social History     Tobacco Use    Smoking status: Current Every Day Smoker     Packs/day: 1.00     Types: Cigarettes    Smokeless tobacco: Never Used   Substance and Sexual Activity    Alcohol use: No    Drug use: No    Sexual activity: Unknown         Review of Systems     Review of Systems   Constitutional: Negative for fever.   HENT: Negative for sore throat.         + dry mouth   Eyes: Positive for  visual disturbance.   Respiratory: Negative for shortness of breath.    Cardiovascular: Negative for chest pain.   Gastrointestinal: Negative for nausea.   Endocrine: Positive for polydipsia and polyuria.   Genitourinary: Negative for dysuria.   Musculoskeletal: Negative for back pain.   Skin: Negative for rash.   Neurological: Positive for numbness. Negative for facial asymmetry, speech difficulty and weakness.   Hematological: Does not bruise/bleed easily.   Psychiatric/Behavioral: Negative for confusion.   All other systems reviewed and are negative.       Physical Exam     Initial Vitals [03/07/20 1818]   BP Pulse Resp Temp SpO2   (!) 146/88 98 20 97.8 °F (36.6 °C) 96 %      MAP       --          Physical Exam  Nursing Notes and Vital Signs Reviewed.  Constitutional: Morbidly obese. NAD  Head: Atraumatic. Normocephalic.  Eyes: PERRL. EOM intact. Conjunctivae are not pale. No scleral icterus.  ENT: Mucous membranes are dry. Oropharynx is clear and symmetric.    Neck: Supple. Full ROM. No lymphadenopathy.  Cardiovascular: Regular rate. Regular rhythm. No murmurs, rubs, or gallops. Distal pulses are 2+ and symmetric.  Pulmonary/Chest: No respiratory distress. Clear to auscultation bilaterally. No wheezing or rales.  Abdominal: Soft and non-distended.  There is no tenderness.  No rebound, guarding, or rigidity. Good bowel sounds.  Genitourinary: No CVA tenderness  Musculoskeletal: Moves all extremities. No obvious deformities. No calf tenderness.  Skin: Warm and dry.  Neurological: Patient is alert and oriented to person, place and time. Pupils ERRL and EOM normal. Cranial nerves II-XII are intact. Strength is full bilaterally; it is equal and 5/5 in bilateral upper and lower extremities. There is no pronator drift of outstretched arms. Speech is clear and normal. No acute focal neurological deficits noted.  Psychiatric: Normal affect. Good eye contact. Appropriate in content.     ED Course   Critical  "Care  Date/Time: 3/7/2020 7:57 PM  Performed by: Nishi Armas MD  Authorized by: Nishi Armas MD   Direct patient critical care time: 20 minutes  Additional history critical care time: 5 minutes  Ordering / reviewing critical care time: 5 minutes  Documentation critical care time: 5 minutes  Total critical care time (exclusive of procedural time) : 35 minutes  Critical care time was exclusive of separately billable procedures and treating other patients and teaching time.  Critical care was necessary to treat or prevent imminent or life-threatening deterioration of the following conditions: new onset diabetes, high blood sugar.  Critical care was time spent personally by me on the following activities: blood draw for specimens, development of treatment plan with patient or surrogate, interpretation of cardiac output measurements, evaluation of patient's response to treatment, examination of patient, obtaining history from patient or surrogate, ordering and performing treatments and interventions, ordering and review of laboratory studies, pulse oximetry, re-evaluation of patient's condition and review of old charts.        ED Vital Signs:  Vitals:    03/07/20 1818 03/07/20 1827 03/07/20 1831 03/07/20 1901   BP: (!) 146/88 (!) 158/79 (!) 155/76 (!) 156/81   Pulse: 98 97 92 86   Resp: 20  (!) 24 18   Temp: 97.8 °F (36.6 °C)      TempSrc: Oral      SpO2: 96% 99% 99% 98%   Weight: (!) 176.7 kg (389 lb 8.9 oz)      Height: 6' 2" (1.88 m)          Abnormal Lab Results:  Labs Reviewed   CBC W/ AUTO DIFFERENTIAL - Abnormal; Notable for the following components:       Result Value    RDW 14.6 (*)     Platelets 147 (*)     Immature Granulocytes 1.1 (*)     Immature Grans (Abs) 0.12 (*)     All other components within normal limits   COMPREHENSIVE METABOLIC PANEL - Abnormal; Notable for the following components:    Sodium 134 (*)     CO2 17 (*)     Glucose 512 (*)     Anion Gap 18 (*)     All other components " within normal limits    Narrative:     GLU critical result(s) called and verbal readback obtained from LANDRY FLORES RN by PRABHA 03/07/2020 19:21   URINALYSIS, REFLEX TO URINE CULTURE - Abnormal; Notable for the following components:    Glucose, UA 3+ (*)     Ketones, UA 1+ (*)     All other components within normal limits    Narrative:     Preferred Collection Type->Urine, Clean Catch   BETA - HYDROXYBUTYRATE, SERUM - Abnormal; Notable for the following components:    Beta-Hydroxybutyrate 0.6 (*)     All other components within normal limits   POCT GLUCOSE - Abnormal; Notable for the following components:    POCT Glucose 432 (*)     All other components within normal limits   ISTAT PROCEDURE - Abnormal; Notable for the following components:    POC PO2 67 (*)     POC HCO3 22.1 (*)     POC SATURATED O2 92 (*)     POC Glucose 426 (*)     POC Potassium 3.4 (*)     All other components within normal limits   DRUG SCREEN PANEL, URINE EMERGENCY    Narrative:     Preferred Collection Type->Urine, Clean Catch   MAGNESIUM   URINALYSIS MICROSCOPIC    Narrative:     Preferred Collection Type->Urine, Clean Catch   HEMOGLOBIN A1C        All Lab Results:  Results for orders placed or performed during the hospital encounter of 03/07/20   CBC auto differential   Result Value Ref Range    WBC 10.66 3.90 - 12.70 K/uL    RBC 5.08 4.60 - 6.20 M/uL    Hemoglobin 14.3 14.0 - 18.0 g/dL    Hematocrit 44.5 40.0 - 54.0 %    Mean Corpuscular Volume 88 82 - 98 fL    Mean Corpuscular Hemoglobin 28.1 27.0 - 31.0 pg    Mean Corpuscular Hemoglobin Conc 32.1 32.0 - 36.0 g/dL    RDW 14.6 (H) 11.5 - 14.5 %    Platelets 147 (L) 150 - 350 K/uL    MPV SEE COMMENT 9.2 - 12.9 fL    Immature Granulocytes 1.1 (H) 0.0 - 0.5 %    Gran # (ANC) 7.1 1.8 - 7.7 K/uL    Immature Grans (Abs) 0.12 (H) 0.00 - 0.04 K/uL    Lymph # 2.6 1.0 - 4.8 K/uL    Mono # 0.7 0.3 - 1.0 K/uL    Eos # 0.2 0.0 - 0.5 K/uL    Baso # 0.02 0.00 - 0.20 K/uL    nRBC 0 0 /100 WBC    Gran% 66.1  38.0 - 73.0 %    Lymph% 24.7 18.0 - 48.0 %    Mono% 6.1 4.0 - 15.0 %    Eosinophil% 1.8 0.0 - 8.0 %    Basophil% 0.2 0.0 - 1.9 %    Differential Method Automated    Comprehensive metabolic panel   Result Value Ref Range    Sodium 134 (L) 136 - 145 mmol/L    Potassium 3.8 3.5 - 5.1 mmol/L    Chloride 99 95 - 110 mmol/L    CO2 17 (L) 23 - 29 mmol/L    Glucose 512 (HH) 70 - 110 mg/dL    BUN, Bld 9 6 - 20 mg/dL    Creatinine 1.2 0.5 - 1.4 mg/dL    Calcium 9.2 8.7 - 10.5 mg/dL    Total Protein 8.2 6.0 - 8.4 g/dL    Albumin 3.9 3.5 - 5.2 g/dL    Total Bilirubin 0.7 0.1 - 1.0 mg/dL    Alkaline Phosphatase 91 55 - 135 U/L    AST 20 10 - 40 U/L    ALT 19 10 - 44 U/L    Anion Gap 18 (H) 8 - 16 mmol/L    eGFR if African American >60 >60 mL/min/1.73 m^2    eGFR if non African American >60 >60 mL/min/1.73 m^2   Urinalysis, Reflex to Urine Culture Urine, Clean Catch   Result Value Ref Range    Specimen UA Urine, Clean Catch     Color, UA Yellow Yellow, Straw, Chayo    Appearance, UA Clear Clear    pH, UA 6.0 5.0 - 8.0    Specific Gravity, UA 1.010 1.005 - 1.030    Protein, UA Negative Negative    Glucose, UA 3+ (A) Negative    Ketones, UA 1+ (A) Negative    Bilirubin (UA) Negative Negative    Occult Blood UA Negative Negative    Nitrite, UA Negative Negative    Urobilinogen, UA Negative <2.0 EU/dL    Leukocytes, UA Negative Negative   Drug screen panel, emergency   Result Value Ref Range    Benzodiazepines Negative     Methadone metabolites Negative     Cocaine (Metab.) Negative     Opiate Scrn, Ur Negative     Barbiturate Screen, Ur Negative     Amphetamine Screen, Ur Negative     THC Negative     Phencyclidine Negative     Creatinine, Random Ur 35.5 23.0 - 375.0 mg/dL    Toxicology Information SEE COMMENT    Beta - Hydroxybutyrate, Serum   Result Value Ref Range    Beta-Hydroxybutyrate 0.6 (H) 0.0 - 0.5 mmol/L   Magnesium   Result Value Ref Range    Magnesium 1.7 1.6 - 2.6 mg/dL   Urinalysis Microscopic   Result Value Ref Range     Bacteria None None-Occ /hpf    Yeast, UA None None    Microscopic Comment SEE COMMENT    POCT glucose   Result Value Ref Range    POCT Glucose 432 (H) 70 - 110 mg/dL   ISTAT PROCEDURE   Result Value Ref Range    POC PH 7.359 7.35 - 7.45    POC PCO2 39.2 35 - 45 mmHg    POC PO2 67 (L) 80 - 100 mmHg    POC HCO3 22.1 (L) 24 - 28 mmol/L    POC BE -3 -2 to 2 mmol/L    POC SATURATED O2 92 (L) 95 - 100 %    POC Glucose 426 (H) 70 - 110 mg/dL    POC Sodium 136 136 - 145 mmol/L    POC Potassium 3.4 (L) 3.5 - 5.1 mmol/L    POC Ionized Calcium 1.24 1.06 - 1.42 mmol/L    POC Hematocrit 44 36 - 54 %PCV    Sample ARTERIAL     Site RR     Allens Test Pass     DelSys Room Air          Imaging Results          CT Head Without Contrast (Final result)  Result time 03/07/20 19:55:00    Final result by Cristian Granda MD (Timothy) (03/07/20 19:55:00)                 Impression:      No acute intracranial abnormality.    All CT scans at this facility use dose modulation, iterative reconstructions, and/or weight base dosing when appropriate to reduce radiation dose to as low as reasonably achievable.      Electronically signed by: Cristian Granda MD  Date:    03/07/2020  Time:    19:55             Narrative:    EXAMINATION:  CT HEAD WITHOUT CONTRAST    CLINICAL HISTORY:  right sided paresthesias;    COMPARISON:  None    FINDINGS:  No intracranial acute hemorrhage or acute focal brain parenchymal abnormality is identified.  Calvarium is intact.  There is small polyps or retention cysts in the left maxillary sinus and right sphenoid sinus.                                 The EKG was ordered, reviewed, and independently interpreted by the ED provider.  Interpretation time: 1832  Rate: 90 BPM  Rhythm: NSR  Interpretation: no acute ST changes. No STEMI.      The Emergency Provider reviewed the vital signs and test results, which are outlined above.     ED Discussion       8:02 PM: Discussed case with Dr. Lemos ([facility]).   Aminta agrees with current care and management of pt and accepts admission.   Admitting Service: Hospital Medicine  Admit to: obs m/s    Re-evaluated pt. I have discussed test results, shared treatment plan, and the need for admission with patient and family at bedside. Pt and family express understanding at this time and agree with all information. All questions answered. Pt and family have no further questions or concerns at this time. Pt is ready for admit.       MDM        Medical Decision Making:   Clinical Tests:   Lab Tests: Reviewed and Ordered  Radiological Study: Ordered and Reviewed  Medical Tests: Reviewed and Ordered           ED Medication(s):  Medications   sodium chloride 0.9% bolus 2,000 mL (2,000 mLs Intravenous New Bag 3/7/20 1915)   insulin regular injection 10 Units (10 Units Intravenous Given 3/7/20 1959)   insulin regular injection 10 Units (10 Units Subcutaneous Given 3/7/20 1957)       New Prescriptions    No medications on file               Scribe Attestation:   Scribe #1: I performed the above scribed service and the documentation accurately describes the services I performed. I attest to the accuracy of the note.     Attending:   Physician Attestation Statement for Scribe #1: I, Nishi Armas MD, personally performed the services described in this documentation, as scribed by Nayan Rider, in my presence, and it is both accurate and complete.           Clinical Impression       ICD-10-CM ICD-9-CM   1. New onset type 2 diabetes mellitus E11.9 250.00   2. Hypertension I10 401.9   3. Hyperglycemia R73.9 790.29   4. Intravascular volume depletion E86.1 276.52   5. Morbid obesity E66.01 278.01       Disposition:   Disposition: Placed in Observation  Condition: Fair         Nishi Armas MD  03/07/20 2014

## 2020-03-08 NOTE — DISCHARGE SUMMARY
[facility] Hospital Medicine  Discharge Summary      Patient Name: Tee Shrestha  MRN: [id number]  Admission Date: 3/7/2020  Hospital Length of Stay: 0 days  Discharge Date and Time: 3/8/2020  1:57 PM  Attending Physician: No att. providers found   Discharging Provider: Luis Armando Matias MD  Primary Care Provider: Bear Walker MD      HPI:   Mr. Mcmillan is a morbidly obese  male who (BMI 50), with history of HTN, presented to the ED complaining of polyuria and polydipsia for the past 1-2 weeks.  Six months ago he was told that he is a prediabetic, but no insulin regimen was recommended rather lifestyle modifications way advised.  In the ED today patient was found to have a blood sugar of 512, with anion gap of 18, and CO2 of 18.  Received 2 L normal saline bolus along with 20 units of regular insulin.  He also complains of blurred vision, concerning for diabetic retinopathy.    Admitting diagnosis new onset type 2 diabetes mellitus    * No surgery found *      Hospital Course:   Patient was for new onset diabetes with hyperglycemia of greater than 500.  Patient was started on sliding scale insulin.  Hemoglobin A1c was greater than 10.  Counseled on dietary modifications and insulin use.  Decision was made to start low-dose insulin upon discharge to be titrated up by primary care.  He was discharged with metformin b.i.d. along with Levemir.     Consults:     No new Assessment & Plan notes have been filed under this hospital service since the last note was generated.  Service: Hospital Medicine    Final Active Diagnoses:    Diagnosis Date Noted POA    PRINCIPAL PROBLEM:  New onset type 2 diabetes mellitus [E11.9] 03/07/2020 Yes    Essential hypertension [I10] 03/07/2020 Yes    Morbid obesity [E66.01] 03/07/2020 Yes    Tobacco use [Z72.0] 03/07/2020 Yes      Problems Resolved During this Admission:       Discharged Condition: good    Disposition: Home or Self Care    Follow  Up:  Follow-up Information     Bear Walker MD In 1 week.    Specialty:  Family Medicine  Contact information:  [address] [facility] [address] [phone]                 Patient Instructions:      Diet diabetic       Significant Diagnostic Studies:   Results for orders placed or performed during the hospital encounter of 03/07/20   CBC auto differential   Result Value Ref Range    WBC 10.66 3.90 - 12.70 K/uL    RBC 5.08 4.60 - 6.20 M/uL    Hemoglobin 14.3 14.0 - 18.0 g/dL    Hematocrit 44.5 40.0 - 54.0 %    Mean Corpuscular Volume 88 82 - 98 fL    Mean Corpuscular Hemoglobin 28.1 27.0 - 31.0 pg    Mean Corpuscular Hemoglobin Conc 32.1 32.0 - 36.0 g/dL    RDW 14.6 (H) 11.5 - 14.5 %    Platelets 147 (L) 150 - 350 K/uL    MPV SEE COMMENT 9.2 - 12.9 fL    Immature Granulocytes 1.1 (H) 0.0 - 0.5 %    Gran # (ANC) 7.1 1.8 - 7.7 K/uL    Immature Grans (Abs) 0.12 (H) 0.00 - 0.04 K/uL    Lymph # 2.6 1.0 - 4.8 K/uL    Mono # 0.7 0.3 - 1.0 K/uL    Eos # 0.2 0.0 - 0.5 K/uL    Baso # 0.02 0.00 - 0.20 K/uL    nRBC 0 0 /100 WBC    Gran% 66.1 38.0 - 73.0 %    Lymph% 24.7 18.0 - 48.0 %    Mono% 6.1 4.0 - 15.0 %    Eosinophil% 1.8 0.0 - 8.0 %    Basophil% 0.2 0.0 - 1.9 %    Differential Method Automated    Comprehensive metabolic panel   Result Value Ref Range    Sodium 134 (L) 136 - 145 mmol/L    Potassium 3.8 3.5 - 5.1 mmol/L    Chloride 99 95 - 110 mmol/L    CO2 17 (L) 23 - 29 mmol/L    Glucose 512 (HH) 70 - 110 mg/dL    BUN, Bld 9 6 - 20 mg/dL    Creatinine 1.2 0.5 - 1.4 mg/dL    Calcium 9.2 8.7 - 10.5 mg/dL    Total Protein 8.2 6.0 - 8.4 g/dL    Albumin 3.9 3.5 - 5.2 g/dL    Total Bilirubin 0.7 0.1 - 1.0 mg/dL    Alkaline Phosphatase 91 55 - 135 U/L    AST 20 10 - 40 U/L    ALT 19 10 - 44 U/L    Anion Gap 18 (H) 8 - 16 mmol/L    eGFR if African American >60 >60 mL/min/1.73 m^2    eGFR if non African American >60 >60 mL/min/1.73 m^2   Urinalysis, Reflex to Urine Culture Urine, Clean Catch   Result Value Ref Range     Specimen UA Urine, Clean Catch     Color, UA Yellow Yellow, Straw, Chayo    Appearance, UA Clear Clear    pH, UA 6.0 5.0 - 8.0    Specific Gravity, UA 1.010 1.005 - 1.030    Protein, UA Negative Negative    Glucose, UA 3+ (A) Negative    Ketones, UA 1+ (A) Negative    Bilirubin (UA) Negative Negative    Occult Blood UA Negative Negative    Nitrite, UA Negative Negative    Urobilinogen, UA Negative <2.0 EU/dL    Leukocytes, UA Negative Negative   Drug screen panel, emergency   Result Value Ref Range    Benzodiazepines Negative     Methadone metabolites Negative     Cocaine (Metab.) Negative     Opiate Scrn, Ur Negative     Barbiturate Screen, Ur Negative     Amphetamine Screen, Ur Negative     THC Negative     Phencyclidine Negative     Creatinine, Random Ur 35.5 23.0 - 375.0 mg/dL    Toxicology Information SEE COMMENT    Beta - Hydroxybutyrate, Serum   Result Value Ref Range    Beta-Hydroxybutyrate 0.6 (H) 0.0 - 0.5 mmol/L   Magnesium   Result Value Ref Range    Magnesium 1.7 1.6 - 2.6 mg/dL   Hemoglobin A1c   Result Value Ref Range    Hemoglobin A1C 10.6 (H) 4.0 - 5.6 %    Estimated Avg Glucose 258 (H) 68 - 131 mg/dL   Urinalysis Microscopic   Result Value Ref Range    Bacteria None None-Occ /hpf    Yeast, UA None None    Microscopic Comment SEE COMMENT    CBC auto differential   Result Value Ref Range    WBC 7.93 3.90 - 12.70 K/uL    RBC 4.48 (L) 4.60 - 6.20 M/uL    Hemoglobin 12.7 (L) 14.0 - 18.0 g/dL    Hematocrit 39.7 (L) 40.0 - 54.0 %    Mean Corpuscular Volume 89 82 - 98 fL    Mean Corpuscular Hemoglobin 28.3 27.0 - 31.0 pg    Mean Corpuscular Hemoglobin Conc 32.0 32.0 - 36.0 g/dL    RDW 14.6 (H) 11.5 - 14.5 %    Platelets 125 (L) 150 - 350 K/uL    MPV SEE COMMENT 9.2 - 12.9 fL    Immature Granulocytes 1.1 (H) 0.0 - 0.5 %    Gran # (ANC) 5.0 1.8 - 7.7 K/uL    Immature Grans (Abs) 0.09 (H) 0.00 - 0.04 K/uL    Lymph # 2.2 1.0 - 4.8 K/uL    Mono # 0.5 0.3 - 1.0 K/uL    Eos # 0.2 0.0 - 0.5 K/uL    Baso # 0.03  0.00 - 0.20 K/uL    nRBC 0 0 /100 WBC    Gran% 62.6 38.0 - 73.0 %    Lymph% 27.7 18.0 - 48.0 %    Mono% 5.9 4.0 - 15.0 %    Eosinophil% 2.3 0.0 - 8.0 %    Basophil% 0.4 0.0 - 1.9 %    Differential Method Automated    Magnesium   Result Value Ref Range    Magnesium 1.7 1.6 - 2.6 mg/dL   Phosphorus   Result Value Ref Range    Phosphorus 2.5 (L) 2.7 - 4.5 mg/dL   Comprehensive metabolic panel   Result Value Ref Range    Sodium 135 (L) 136 - 145 mmol/L    Potassium 3.5 3.5 - 5.1 mmol/L    Chloride 105 95 - 110 mmol/L    CO2 22 (L) 23 - 29 mmol/L    Glucose 287 (H) 70 - 110 mg/dL    BUN, Bld 8 6 - 20 mg/dL    Creatinine 0.8 0.5 - 1.4 mg/dL    Calcium 8.0 (L) 8.7 - 10.5 mg/dL    Total Protein 6.5 6.0 - 8.4 g/dL    Albumin 3.2 (L) 3.5 - 5.2 g/dL    Total Bilirubin 0.4 0.1 - 1.0 mg/dL    Alkaline Phosphatase 72 55 - 135 U/L    AST 16 10 - 40 U/L    ALT 16 10 - 44 U/L    Anion Gap 8 8 - 16 mmol/L    eGFR if African American >60 >60 mL/min/1.73 m^2    eGFR if non African American >60 >60 mL/min/1.73 m^2   Lipid panel   Result Value Ref Range    Cholesterol 200 (H) 120 - 199 mg/dL    Triglycerides 138 30 - 150 mg/dL    HDL 27 (L) 40 - 75 mg/dL    LDL Cholesterol 145.4 63.0 - 159.0 mg/dL    Hdl/Cholesterol Ratio 13.5 (L) 20.0 - 50.0 %    Total Cholesterol/HDL Ratio 7.4 (H) 2.0 - 5.0    Non-HDL Cholesterol 173 mg/dL   POCT glucose   Result Value Ref Range    POCT Glucose 432 (H) 70 - 110 mg/dL   ISTAT PROCEDURE   Result Value Ref Range    POC PH 7.359 7.35 - 7.45    POC PCO2 39.2 35 - 45 mmHg    POC PO2 67 (L) 80 - 100 mmHg    POC HCO3 22.1 (L) 24 - 28 mmol/L    POC BE -3 -2 to 2 mmol/L    POC SATURATED O2 92 (L) 95 - 100 %    POC Glucose 426 (H) 70 - 110 mg/dL    POC Sodium 136 136 - 145 mmol/L    POC Potassium 3.4 (L) 3.5 - 5.1 mmol/L    POC Ionized Calcium 1.24 1.06 - 1.42 mmol/L    POC Hematocrit 44 36 - 54 %PCV    Sample ARTERIAL     Site RR     Allens Test Pass     DelSys Room Air    POCT glucose   Result Value Ref Range     POCT Glucose 235 (H) 70 - 110 mg/dL   POCT glucose   Result Value Ref Range    POCT Glucose 276 (H) 70 - 110 mg/dL        Pending Diagnostic Studies:     None         Medications:  Reconciled Home Medications:      Medication List      START taking these medications    blood-glucose meter kit  Use as instructed     insulin detemir U-100 100 unit/mL (3 mL) Inpn pen  Commonly known as:  LEVEMIR FLEXTOUCH  Inject 20 Units into the skin every evening.  Start taking on:  March 9, 2020     losartan 25 MG tablet  Commonly known as:  COZAAR  Take 1 tablet (25 mg total) by mouth once daily.     metFORMIN 1000 MG tablet  Commonly known as:  GLUCOPHAGE  Take 1 tablet (1,000 mg total) by mouth 2 (two) times daily with meals.        CONTINUE taking these medications    amLODIPine 5 MG tablet  Commonly known as:  NORVASC  Take 5 mg by mouth.     cetirizine 10 MG tablet  Commonly known as:  ZYRTEC  Take 1 tablet (10 mg total) by mouth once daily.     HYDROcodone-acetaminophen 7.5-325 mg per tablet  Commonly known as:  NORCO  Take 1 tablet by mouth every 6 (six) hours as needed for Pain.     ketorolac 10 mg tablet  Commonly known as:  TORADOL  Take 1 tablet (10 mg total) by mouth every 6 (six) hours.        STOP taking these medications    hydroCHLOROthiazide 12.5 mg capsule  Commonly known as:  MICROZIDE            Indwelling Lines/Drains at time of discharge:   Lines/Drains/Airways     None                 Time spent on the discharge of patient: 40 minutes  Patient was seen and examined on the date of discharge and determined to be suitable for discharge.         Luis Armando Matias MD  Department of Hospital Medicine  [facility]

## 2020-03-08 NOTE — NURSING
Diabetic education handout given and reviewed with patient, pt verbalized understanding of instructions given and demonstrated.

## 2020-03-08 NOTE — ED NOTES
Assumed care of pt at this time. Pt lying in bed comfortably. AAO x 4. Resp even and unlabored with equal chest rise and fall. Skin warm and dry. 20G PIV noted to R AC. Flushes well, drg CDI. Side rails up x 2. Call light within reach. No distress noted. Pt denies any needs or assist at this time.

## 2020-03-08 NOTE — DISCHARGE INSTRUCTIONS
Diet: Diabetes  Food is an important tool that you can use to control diabetes and stay healthy. Eating well-balanced meals in the correct amounts will help you control your blood glucose levels and prevent low blood sugar reactions. It will also help you reduce the health risks of diabetes. There is no one specific diet that is right for everyone with diabetes. But there are general guidelines to follow. A registered dietitian (RD) will create a tailored diet approach thats just right for you. He or she will also help you plan healthy meals and snacks. If you have any questions, call your dietitian for advice.     Guidelines for success  Talk with your healthcare provider before starting a diabetes diet or weight loss program. If you haven't talked with a dietitian yet, ask your provider for a referral. The following guidelines can help you succeed:  · Select foods from the 6 food groups below. Your dietitian will help you find food choices within each group. He or she will also show you serving sizes and how many servings you can have at each meal.  ¨ Grains, beans, and starchy vegetables  ¨ Vegetables  ¨ Fruit  ¨ Milk or yogurt  ¨ Meat, poultry, fish, or tofu  ¨ Healthy fats  · Check your blood sugar levels as directed by your provider. Take any medicine as prescribed by your provider.  · Learn to read food labels and pick the right portion sizes.  · Eat only the amount of food in your meal plan. Eat about the same amount of food at regular times each day. Dont skip meals. Eat meals 4 to 5 hours apart, with snacks in between.  · Limit alcohol. It raises blood sugar levels. Drink water or calorie-free diet drinks that use safe sweeteners.  · Eat less fat to help lower your risk of heart disease. Use nonfat or low-fat dairy products and lean meats. Avoid fried foods. Use cooking oils that are unsaturated, such as olive, canola, or peanut oil.  · Talk with your dietitian about safe sugar substitutes.  · Avoid  added salt. It can contribute to high blood pressure, which can cause heart disease. People with diabetes already have a risk of high blood pressure and heart disease.  · Stay at a healthy weight. If you need to lose weight, cut down on your portion sizes. But dont skip meals. Exercise is an important part of any weight management program. Talk with your provider about an exercise program thats right for you.  · For more information about the best diet plan for you, talk with a registered dietitian (RD). To find an RD in your area, contact:  ¨ Academy of Nutrition and Dietetics www.eatright.org  ¨ The American Diabetes Association [phone] www.diabetes.org  Date Last Reviewed: 8/1/2016 © 2000-2017 [employer]. [address] [place] [address], [place] [address]. All rights reserved. This information is not intended as a substitute for professional medical care. Always follow your healthcare professional's instructions.        Understanding Carbohydrates, Fats, and Protein  Food is a source of fuel and nourishment for your body. Its also a source of pleasure. Having diabetes doesnt mean you have to eat special foods or give up desserts. Instead, your dietitian can show you how to plan meals to suit your body. To start, learn how different foods affect blood sugar.  Carbohydrates  Carbohydrates are the main source of fuel for the body. Carbohydrates raise blood sugar. Many people think carbohydrates are only found in pasta or bread. But carbohydrates are actually in many kinds of foods:  · Sugars occur naturally in foods such as fruit, milk, honey, and molasses. Sugars can also be added to many foods, from cereals and yogurt to candy and desserts. Sugars raise blood sugar.  · Starches are found in bread, cereals, pasta, and dried beans. Theyre also found in corn, peas, potatoes, yam, acorn squash, and butternut squash. Starches also raise blood sugar.   · Fiber is found in foods such as vegetables,  fruits, beans, and whole grains. Unlike other carbs, fiber isnt digested or absorbed. So it doesnt raise blood sugar. In fact, fiber can help keep blood sugar from rising too fast. It also helps keep blood cholesterol at a healthy level.  Did you know?  Even though carbohydrates raise blood sugar, its best to have some in every meal. They are an important part of a healthy diet.   Fat  Fat is an energy source that can be stored until needed. Fat does not raise blood sugar. However, it can raise blood cholesterol, increasing the risk of heart disease. Fat is also high in calories, which can cause weight gain. Not all types of fat are the same.  More Healthy:  · Monounsaturated fats are mostly found in vegetable oils, such as olive, canola, and peanut oils. They are also found in avocados and some nuts. Monounsaturated fats are healthy for your heart. Thats because they lower LDL (unhealthy) cholesterol.  · Polyunsaturated fats are mostly found in vegetable oils, such as corn, safflower, and soybean oils. They are also found in some seeds, nuts, and fish. Polyunsaturated fats lower LDL (unhealthy) cholesterol. So, choosing them instead of saturated fats is healthy for your heart. Certain unsaturated fats can help lower triglycerides.   Less Healthy:  · Saturated fats are found in animal products, such as meat, poultry, whole milk, lard, and butter. Saturated fats raise LDL cholesterol and are not healthy for your heart.  · Hydrogenated oils and trans fats are formed when vegetable oils are processed into solid fats. They are found in many processed foods. Hydrogenated oils and trans fats raise LDL cholesterol and lower HDL (healthy) cholesterol. They are not healthy for your heart.  Protein  Protein helps the body build and repair muscle and other tissue. Protein has little or no effect on blood sugar. However, many foods that contain protein also contain saturated fat. By choosing low-fat protein sources, you can  get the benefits of protein without the extra fat:  · Plant protein is found in dry beans and peas, nuts, and soy products, such as tofu and soymilk. These sources tend to be cholesterol-free and low in saturated fat.  · Animal protein is found in fish, poultry, meat, cheese, milk, and eggs. These contain cholesterol and can be high in saturated fat. Aim for lean, lower-fat choices.  Date Last Reviewed: 3/1/2016  © 0613-2368 [employer]. [address] [place] [address]. All rights reserved. This information is not intended as a substitute for professional medical care. Always follow your healthcare professional's instructions.      Glucose (Blood)  Does this test have other names?  Blood sugar, self-monitoring of blood glucose (SMBG), fasting plasma glucose (FPG), random plasma glucose  What is this test?  A blood glucose test is a blood test that tells you if your level of glucose, or blood sugar, is within a healthy range. Fasting plasma glucose, or FPG, is a common test used to diagnose and monitor diabetes or prediabetes.   Why do I need this test?  A healthcare provider may recommend a blood glucose test if you have symptoms of diabetes. These include increased thirst, unexplained weight loss, increased urination, tiredness, blurred vision, and sores that don't heal. Sometimes people with prediabetes or diabetes don't have any symptoms.  If you are overweight, obese, or have other risk factors for diabetes like high blood sugar, your healthcare provider may recommend this test. Other risk factors for diabetes include high blood pressure, high cholesterol, physical inactivity, and a family history of diabetes. The U.S. Preventive Services Task Force recommends that adults ages 40 to 70 who are obese or overweight have their blood glucose checked at least every 3 years as long as their results are normal. All adults should be tested for diabetes every 3 years beginning at age 45, no matter  what their weight.   If you are pregnant and have a risk of developing gestational diabetes, you may be screened frequently during and after your pregnancy.   What other tests might I have along with this test?  Other tests that are used to diagnose diabetes or monitor blood glucose include an A1C blood test. A variation on the blood glucose test that is also sometimes used is called an oral glucose tolerance test, or OGTT. Because heart health is so closely tied to diabetes, regular checks of blood pressure, cholesterol, and triglycerides are important, too.  What do my test results mean?  A result for a lab test may be affected by many things, including the method the laboratory uses to do the test. If your test results are different from the normal value, you may not have a problem. To learn what the results mean for you, talk with your healthcare provider.  Target blood glucose ranges vary from person to person. If you have diabetes, the American Diabetes Association's target blood glucose reading for you if you're not pregnant is between 70 and 130 milligrams per deciliter (mg/dL) before a meal. After a meal, it should be less than 180 mg/dL. Levels that are lower or higher than these may be a sign of blood sugar control problems.  For the FPG test, a level of 99 or below is normal. A level of 100 to 125 means you may have prediabetes. A level of 126 or above means you may have diabetes and need to do the test again on a different day to be sure. If you have an abnormal blood glucose, your healthcare provider may recommend behavioral counseling to help you eat better and get more exercise.  How is this test done?  The test requires a blood sample, which is drawn through a needle from a vein in your arm. Your blood glucose level will be checked with this sample.  At some healthcare provider offices and when you are self-monitoring blood glucose at home, you will use a lancing device to prick your finger. Then  you will collect a drop of blood on a test strip. A machine will test the drop of blood and show your blood glucose level on the meter's display panel.  Does this test pose any risks?  Taking a blood sample with a needle carries risks that include bleeding, infection, bruising, or feeling dizzy. When the needle pricks your arm, you may feel a slight stinging sensation or pain. Afterward, the site may be slightly sore.   What might affect my test results?  A number of factors, primarily diet, can affect blood glucose levels. Follow your healthcare provider's instructions about when to check your blood glucose and what to do before and after checking it.  How do I get ready for this test?  When your blood is drawn in an office, you typically need to fast for eight hours before the test. This means you should eat nothing and drink only water. When monitoring your blood glucose levels at home, you will often be asked to check it at different times, including before and after meals. Carefully follow your healthcare provider's instructions for checking blood glucose levels at home.   © 9341-0297 [employer]. [address] [place] [address], [place] [address]. All rights reserved. This information is not intended as a substitute for professional medical care. Always follow your healthcare professional's instructions.

## 2020-03-08 NOTE — HOSPITAL COURSE
Patient was for new onset diabetes with hyperglycemia of greater than 500.  Patient was started on sliding scale insulin.  Hemoglobin A1c was greater than 10.  Counseled on dietary modifications and insulin use.  Decision was made to start low-dose insulin upon discharge to be titrated up by primary care.  He was discharged with metformin b.i.d. along with Levemir.

## 2020-03-08 NOTE — SUBJECTIVE & OBJECTIVE
Past Medical History:   Diagnosis Date    Abscess     GERD (gastroesophageal reflux disease)     Hypertension        History reviewed. No pertinent surgical history.    Review of patient's allergies indicates:   Allergen Reactions    Hydrocodone-acetaminophen Hives and Itching       No current facility-administered medications on file prior to encounter.      Current Outpatient Medications on File Prior to Encounter   Medication Sig    amLODIPine (NORVASC) 5 MG tablet Take 5 mg by mouth.    cetirizine (ZYRTEC) 10 MG tablet Take 1 tablet (10 mg total) by mouth once daily.    hydroCHLOROthiazide (MICROZIDE) 12.5 mg capsule Take 12.5 mg by mouth.    HYDROcodone-acetaminophen (NORCO) 7.5-325 mg per tablet Take 1 tablet by mouth every 6 (six) hours as needed for Pain.    ketorolac (TORADOL) 10 mg tablet Take 1 tablet (10 mg total) by mouth every 6 (six) hours.     Family History     Problem Relation (Age of Onset)    Diabetes Mother    Hypertension Mother    No Known Problems Father        Tobacco Use    Smoking status: Current Every Day Smoker     Packs/day: 1.00     Types: Cigarettes    Smokeless tobacco: Never Used   Substance and Sexual Activity    Alcohol use: No    Drug use: No    Sexual activity: Not on file     Review of Systems   Constitutional: Negative.  Negative for appetite change, fatigue and fever.   HENT: Negative.  Negative for congestion, nosebleeds and sore throat.    Eyes: Positive for visual disturbance.   Respiratory: Negative.  Negative for cough, shortness of breath and wheezing.    Cardiovascular: Negative.  Negative for chest pain, palpitations and leg swelling.   Gastrointestinal: Negative.  Negative for abdominal pain, constipation, diarrhea, nausea and vomiting.   Endocrine: Positive for polydipsia, polyphagia and polyuria.   Genitourinary: Negative.  Negative for dysuria and flank pain.   Musculoskeletal: Negative.  Negative for arthralgias, back pain and joint swelling.    Skin: Negative.  Negative for color change, pallor and rash.   Allergic/Immunologic: Negative.  Negative for immunocompromised state.   Neurological: Negative.  Negative for dizziness, syncope, weakness, light-headedness, numbness and headaches.   Hematological: Negative.    Psychiatric/Behavioral: Negative.  Negative for confusion and hallucinations. The patient is not nervous/anxious.    All other systems reviewed and are negative.    Objective:     Vital Signs (Most Recent):  Temp: 97.8 °F (36.6 °C) (03/07/20 1818)  Pulse: 89 (03/07/20 2001)  Resp: 20 (03/07/20 2001)  BP: (!) 143/70 (03/07/20 2001)  SpO2: 97 % (03/07/20 2001) Vital Signs (24h Range):  Temp:  [97.8 °F (36.6 °C)] 97.8 °F (36.6 °C)  Pulse:  [86-98] 89  Resp:  [18-24] 20  SpO2:  [96 %-99 %] 97 %  BP: (143-158)/(70-88) 143/70     Weight: (!) 176.7 kg (389 lb 8.9 oz)  Body mass index is 50.02 kg/m².    Physical Exam   Constitutional: He is oriented to person, place, and time. He appears well-developed and well-nourished. No distress.   Morbidly obese  female, in no respiratory distress.  Comfortably lying in bed.  Significant other at the bedside.   HENT:   Head: Normocephalic and atraumatic.   Eyes: Pupils are equal, round, and reactive to light. Conjunctivae are normal. No scleral icterus.   Neck: Normal range of motion. Neck supple. No thyromegaly present.   Cardiovascular: Normal rate, regular rhythm and normal heart sounds.   No murmur heard.  Pulmonary/Chest: Effort normal and breath sounds normal. No respiratory distress. He has no wheezes. He exhibits no tenderness.   Abdominal: Soft. Bowel sounds are normal. There is no tenderness.   Obese   Musculoskeletal: Normal range of motion. He exhibits no edema or tenderness.   Neurological: He is alert and oriented to person, place, and time. No cranial nerve deficit. He exhibits normal muscle tone. Coordination normal.   Skin: Skin is warm and dry. He is not diaphoretic.    Psychiatric: He has a normal mood and affect. His behavior is normal. Thought content normal.   Nursing note and vitals reviewed.        CRANIAL NERVES     CN III, IV, VI   Pupils are equal, round, and reactive to light.       Significant Labs:   A1C: No results for input(s): HGBA1C in the last 4320 hours.  BMP:   Recent Labs   Lab 03/07/20  1832   *   *   K 3.8   CL 99   CO2 17*   BUN 9   CREATININE 1.2   CALCIUM 9.2   MG 1.7     CBC:   Recent Labs   Lab 03/07/20 1832 03/07/20  1925   WBC 10.66  --    HGB 14.3  --    HCT 44.5 44   *  --      CMP:   Recent Labs   Lab 03/07/20  1832   *   K 3.8   CL 99   CO2 17*   *   BUN 9   CREATININE 1.2   CALCIUM 9.2   PROT 8.2   ALBUMIN 3.9   BILITOT 0.7   ALKPHOS 91   AST 20   ALT 19   ANIONGAP 18*   EGFRNONAA >60     Cardiac Markers: No results for input(s): CKMB, MYOGLOBIN, BNP, TROPISTAT in the last 48 hours.  Lactic Acid: No results for input(s): LACTATE in the last 48 hours.  POCT Glucose:   Recent Labs   Lab 03/07/20  1818   POCTGLUCOSE 432*     Troponin: No results for input(s): TROPONINI in the last 48 hours.  TSH: No results for input(s): TSH in the last 4320 hours.  Urine Studies:   Recent Labs   Lab 03/07/20  1908   COLORU Yellow   APPEARANCEUA Clear   PHUR 6.0   SPECGRAV 1.010   PROTEINUA Negative   GLUCUA 3+*   KETONESU 1+*   BILIRUBINUA Negative   OCCULTUA Negative   NITRITE Negative   UROBILINOGEN Negative   LEUKOCYTESUR Negative   BACTERIA None       Significant Imaging: I have reviewed and interpreted all pertinent imaging results/findings within the past 24 hours.     Imaging Results          CT Head Without Contrast (Final result)  Result time 03/07/20 19:55:00    Final result by Cristian Granda MD (Timothy) (03/07/20 19:55:00)                 Impression:      No acute intracranial abnormality.    All CT scans at this facility use dose modulation, iterative reconstructions, and/or weight base dosing when appropriate to  reduce radiation dose to as low as reasonably achievable.      Electronically signed by: Cristian Granda MD  Date:    03/07/2020  Time:    19:55             Narrative:    EXAMINATION:  CT HEAD WITHOUT CONTRAST    CLINICAL HISTORY:  right sided paresthesias;    COMPARISON:  None    FINDINGS:  No intracranial acute hemorrhage or acute focal brain parenchymal abnormality is identified.  Calvarium is intact.  There is small polyps or retention cysts in the left maxillary sinus and right sphenoid sinus.                                I have independently reviewed all pertinent labs within the past 24 hours.    I have independently reviewed, visualized and interpreted all pertinent imaging results within the past 24 hours and discussed the findings with the ED physician, Dr. Armas

## 2020-03-08 NOTE — HPI
Mr. Mcmillan is a morbidly obese  male who (BMI 50), with history of HTN, presented to the ED complaining of polyuria and polydipsia for the past 1-2 weeks.  Six months ago he was told that he is a prediabetic, but no insulin regimen was recommended rather lifestyle modifications way advised.  In the ED today patient was found to have a blood sugar of 512, with anion gap of 18, and CO2 of 18.  Received 2 L normal saline bolus along with 20 units of regular insulin.  He also complains of blurred vision, concerning for diabetic retinopathy.    Admitting diagnosis new onset type 2 diabetes mellitus

## 2020-03-08 NOTE — PLAN OF CARE
No acute changes during shift  Pt AAOx4  Plan of care reviewed with patient. Verbalized understanding  Pt remained free from falls. All fall and safety precautions in place  Med surg overflow, no tele order  PIV, CDI. NS infusing at 150cc/hr  VSS  Call bell and personal items within reach.  Hourly rounding complete. Instructed to call for assistance  Pt denies needs and has no c/o at this time  Continue current plan of care     Problem: Fall Injury Risk  Goal: Absence of Fall and Fall-Related Injury  Outcome: Ongoing, Progressing     Problem: Adult Inpatient Plan of Care  Goal: Plan of Care Review  Outcome: Ongoing, Progressing  Flowsheets (Taken 3/8/2020 0518)  Plan of Care Reviewed With: patient  Goal: Patient-Specific Goal (Individualization)  Outcome: Ongoing, Progressing  Goal: Absence of Hospital-Acquired Illness or Injury  Outcome: Ongoing, Progressing  Goal: Optimal Comfort and Wellbeing  Outcome: Ongoing, Progressing  Goal: Readiness for Transition of Care  Outcome: Ongoing, Progressing  Goal: Rounds/Family Conference  Outcome: Ongoing, Progressing     Problem: Diabetes Comorbidity  Goal: Blood Glucose Level Within Desired Range  Outcome: Ongoing, Progressing

## 2020-03-08 NOTE — H&P
[facility] Hospital Medicine  History & Physical    Patient Name: Tee Shrestha  MRN: [id number]  Admission Date: 3/7/2020  Attending Physician: Ye Lemos MD  Primary Care Provider: Bear Walker MD         Patient information was obtained from patient, spouse/SO, past medical records and ER records.     Subjective:     Principal Problem:New onset type 2 diabetes mellitus    Chief Complaint:   Chief Complaint   Patient presents with    Numbness     pt c/o blurred vision and R-sided numbness that began x2 nights ago        HPI: Mr. Mcmillan is a morbidly obese  male who (BMI 50), with history of HTN, presented to the ED complaining of polyuria and polydipsia for the past 1-2 weeks.  Six months ago he was told that he is a prediabetic, but no insulin regimen was recommended rather lifestyle modifications way advised.  In the ED today patient was found to have a blood sugar of 512, with anion gap of 18, and CO2 of 18.  Received 2 L normal saline bolus along with 20 units of regular insulin.  He also complains of blurred vision, concerning for diabetic retinopathy.    Admitting diagnosis new onset type 2 diabetes mellitus    Past Medical History:   Diagnosis Date    Abscess     GERD (gastroesophageal reflux disease)     Hypertension        History reviewed. No pertinent surgical history.    Review of patient's allergies indicates:   Allergen Reactions    Hydrocodone-acetaminophen Hives and Itching       No current facility-administered medications on file prior to encounter.      Current Outpatient Medications on File Prior to Encounter   Medication Sig    amLODIPine (NORVASC) 5 MG tablet Take 5 mg by mouth.    cetirizine (ZYRTEC) 10 MG tablet Take 1 tablet (10 mg total) by mouth once daily.    hydroCHLOROthiazide (MICROZIDE) 12.5 mg capsule Take 12.5 mg by mouth.    HYDROcodone-acetaminophen (NORCO) 7.5-325 mg per tablet Take 1 tablet by mouth every 6 (six)  hours as needed for Pain.    ketorolac (TORADOL) 10 mg tablet Take 1 tablet (10 mg total) by mouth every 6 (six) hours.     Family History     Problem Relation (Age of Onset)    Diabetes Mother    Hypertension Mother    No Known Problems Father        Tobacco Use    Smoking status: Current Every Day Smoker     Packs/day: 1.00     Types: Cigarettes    Smokeless tobacco: Never Used   Substance and Sexual Activity    Alcohol use: No    Drug use: No    Sexual activity: Not on file     Review of Systems   Constitutional: Negative.  Negative for appetite change, fatigue and fever.   HENT: Negative.  Negative for congestion, nosebleeds and sore throat.    Eyes: Positive for visual disturbance.   Respiratory: Negative.  Negative for cough, shortness of breath and wheezing.    Cardiovascular: Negative.  Negative for chest pain, palpitations and leg swelling.   Gastrointestinal: Negative.  Negative for abdominal pain, constipation, diarrhea, nausea and vomiting.   Endocrine: Positive for polydipsia, polyphagia and polyuria.   Genitourinary: Negative.  Negative for dysuria and flank pain.   Musculoskeletal: Negative.  Negative for arthralgias, back pain and joint swelling.   Skin: Negative.  Negative for color change, pallor and rash.   Allergic/Immunologic: Negative.  Negative for immunocompromised state.   Neurological: Negative.  Negative for dizziness, syncope, weakness, light-headedness, numbness and headaches.   Hematological: Negative.    Psychiatric/Behavioral: Negative.  Negative for confusion and hallucinations. The patient is not nervous/anxious.    All other systems reviewed and are negative.    Objective:     Vital Signs (Most Recent):  Temp: 97.8 °F (36.6 °C) (03/07/20 1818)  Pulse: 89 (03/07/20 2001)  Resp: 20 (03/07/20 2001)  BP: (!) 143/70 (03/07/20 2001)  SpO2: 97 % (03/07/20 2001) Vital Signs (24h Range):  Temp:  [97.8 °F (36.6 °C)] 97.8 °F (36.6 °C)  Pulse:  [86-98] 89  Resp:  [18-24] 20  SpO2:  [96  %-99 %] 97 %  BP: (143-158)/(70-88) 143/70     Weight: (!) 176.7 kg (389 lb 8.9 oz)  Body mass index is 50.02 kg/m².    Physical Exam   Constitutional: He is oriented to person, place, and time. He appears well-developed and well-nourished. No distress.   Morbidly obese  female, in no respiratory distress.  Comfortably lying in bed.  Significant other at the bedside.   HENT:   Head: Normocephalic and atraumatic.   Eyes: Pupils are equal, round, and reactive to light. Conjunctivae are normal. No scleral icterus.   Neck: Normal range of motion. Neck supple. No thyromegaly present.   Cardiovascular: Normal rate, regular rhythm and normal heart sounds.   No murmur heard.  Pulmonary/Chest: Effort normal and breath sounds normal. No respiratory distress. He has no wheezes. He exhibits no tenderness.   Abdominal: Soft. Bowel sounds are normal. There is no tenderness.   Obese   Musculoskeletal: Normal range of motion. He exhibits no edema or tenderness.   Neurological: He is alert and oriented to person, place, and time. No cranial nerve deficit. He exhibits normal muscle tone. Coordination normal.   Skin: Skin is warm and dry. He is not diaphoretic.   Psychiatric: He has a normal mood and affect. His behavior is normal. Thought content normal.   Nursing note and vitals reviewed.        CRANIAL NERVES     CN III, IV, VI   Pupils are equal, round, and reactive to light.       Significant Labs:   A1C: No results for input(s): HGBA1C in the last 4320 hours.  BMP:   Recent Labs   Lab 03/07/20 1832   *   *   K 3.8   CL 99   CO2 17*   BUN 9   CREATININE 1.2   CALCIUM 9.2   MG 1.7     CBC:   Recent Labs   Lab 03/07/20 1832 03/07/20  1925   WBC 10.66  --    HGB 14.3  --    HCT 44.5 44   *  --      CMP:   Recent Labs   Lab 03/07/20 1832   *   K 3.8   CL 99   CO2 17*   *   BUN 9   CREATININE 1.2   CALCIUM 9.2   PROT 8.2   ALBUMIN 3.9   BILITOT 0.7   ALKPHOS 91   AST 20   ALT 19    ANIONGAP 18*   EGFRNONAA >60     Cardiac Markers: No results for input(s): CKMB, MYOGLOBIN, BNP, TROPISTAT in the last 48 hours.  Lactic Acid: No results for input(s): LACTATE in the last 48 hours.  POCT Glucose:   Recent Labs   Lab 03/07/20  1818   POCTGLUCOSE 432*     Troponin: No results for input(s): TROPONINI in the last 48 hours.  TSH: No results for input(s): TSH in the last 4320 hours.  Urine Studies:   Recent Labs   Lab 03/07/20  1908   COLORU Yellow   APPEARANCEUA Clear   PHUR 6.0   SPECGRAV 1.010   PROTEINUA Negative   GLUCUA 3+*   KETONESU 1+*   BILIRUBINUA Negative   OCCULTUA Negative   NITRITE Negative   UROBILINOGEN Negative   LEUKOCYTESUR Negative   BACTERIA None       Significant Imaging: I have reviewed and interpreted all pertinent imaging results/findings within the past 24 hours.     Imaging Results          CT Head Without Contrast (Final result)  Result time 03/07/20 19:55:00    Final result by Cristian Granda MD (Timothy) (03/07/20 19:55:00)                 Impression:      No acute intracranial abnormality.    All CT scans at this facility use dose modulation, iterative reconstructions, and/or weight base dosing when appropriate to reduce radiation dose to as low as reasonably achievable.      Electronically signed by: Cristian Granda MD  Date:    03/07/2020  Time:    19:55             Narrative:    EXAMINATION:  CT HEAD WITHOUT CONTRAST    CLINICAL HISTORY:  right sided paresthesias;    COMPARISON:  None    FINDINGS:  No intracranial acute hemorrhage or acute focal brain parenchymal abnormality is identified.  Calvarium is intact.  There is small polyps or retention cysts in the left maxillary sinus and right sphenoid sinus.                                I have independently reviewed all pertinent labs within the past 24 hours.    I have independently reviewed, visualized and interpreted all pertinent imaging results within the past 24 hours and discussed the findings with the ED  physician, Dr. Armas            Assessment/Plan:     * New onset type 2 diabetes mellitus  Polyuria, polydipsia, polyphagia for the past 1-2 weeks.  Blood sugar 512, AG18, CO2 18.  Received 2 L normal saline bolus in the ED.  Received 20 units regular insulin in the ED.  Admit for new onset diabetes.  Diabetic education in a.m..  Check hemoglobin A1c and lipid profile.  Counseled about lifestyle modifications and complications of diabetic retinopathy, nephropathy, neuropathy.  Continue normal saline at 150 cc/hour.    Tobacco use  Nicotine patch      Morbid obesity  BMI 50.02      Essential hypertension  Continue home dose amlodipine.  Will add ACE I for nephro protective properties in setting of diabetes.        VTE Risk Mitigation (From admission, onward)         Ordered     enoxaparin injection 40 mg  Daily      03/07/20 2041     Place sequential compression device  Until discontinued      03/07/20 2041                   Ye Lemos MD  Department of Hospital Medicine   [facility]

## 2020-03-09 ENCOUNTER — OFFICE VISIT (OUTPATIENT)
Dept: DIABETES | Facility: CLINIC | Age: 40
End: 2020-03-09
Payer: MEDICAID

## 2020-03-09 VITALS
WEIGHT: 315 LBS | BODY MASS INDEX: 40.43 KG/M2 | HEIGHT: 74 IN | SYSTOLIC BLOOD PRESSURE: 152 MMHG | DIASTOLIC BLOOD PRESSURE: 92 MMHG

## 2020-03-09 DIAGNOSIS — E11.9 NEW ONSET TYPE 2 DIABETES MELLITUS: Primary | ICD-10-CM

## 2020-03-09 DIAGNOSIS — I10 ESSENTIAL HYPERTENSION: ICD-10-CM

## 2020-03-09 PROCEDURE — 99205 OFFICE O/P NEW HI 60 MIN: CPT | Mod: S$PBB,,, | Performed by: NURSE PRACTITIONER

## 2020-03-09 PROCEDURE — 99205 PR OFFICE/OUTPT VISIT, NEW, LEVL V, 60-74 MIN: ICD-10-PCS | Mod: S$PBB,,, | Performed by: NURSE PRACTITIONER

## 2020-03-09 PROCEDURE — 99213 OFFICE O/P EST LOW 20 MIN: CPT | Mod: PBBFAC | Performed by: NURSE PRACTITIONER

## 2020-03-09 PROCEDURE — 99999 PR PBB SHADOW E&M-EST. PATIENT-LVL III: CPT | Mod: PBBFAC,,, | Performed by: NURSE PRACTITIONER

## 2020-03-09 PROCEDURE — 99999 PR PBB SHADOW E&M-EST. PATIENT-LVL III: ICD-10-PCS | Mod: PBBFAC,,, | Performed by: NURSE PRACTITIONER

## 2020-03-09 RX ORDER — PEN NEEDLE, DIABETIC 30 GX3/16"
1 NEEDLE, DISPOSABLE MISCELLANEOUS DAILY
Qty: 100 EACH | Refills: 11 | Status: SHIPPED | OUTPATIENT
Start: 2020-03-09 | End: 2021-05-19

## 2020-03-09 RX ORDER — LANCETS
1 EACH MISCELLANEOUS 3 TIMES DAILY
Qty: 100 EACH | Refills: 11 | Status: SHIPPED | OUTPATIENT
Start: 2020-03-09 | End: 2023-09-11 | Stop reason: SDUPTHER

## 2020-03-09 RX ORDER — INSULIN PUMP SYRINGE, 3 ML
EACH MISCELLANEOUS
Qty: 1 EACH | Refills: 0 | Status: SHIPPED | OUTPATIENT
Start: 2020-03-09 | End: 2023-09-11 | Stop reason: SDUPTHER

## 2020-03-09 NOTE — PROGRESS NOTES
"Subjective:         Patient ID: Tee Shrestha is a 39 y.o. male.  Patient's current PCP is Bear Walker MD.       Chief Complaint: Diabetes Mellitus    HPI  Tee Shrestha is a 39 y.o. Black or  male presenting for a new consult for diabetes.  Patient here for a new diagnosis of DM- went ER for classic symptoms on 3/7/20; started on Metformin, he did not start the Levemir- needs pen needles. He has the following complications related to diabetes:  HTN, Hyperlipidemia. Past failed treatment include: none.  Blood glucose testing is not performed- needs meter supplies.  The patient reports diet noncompliance much of the time. He reports recent hospital admissions, reports emergency room visits, denies hypoglycemia.      Height: 6' 2" (188 cm)  //  Weight: (!) 178.6 kg (393 lb 11.9 oz), Body mass index is 50.55 kg/m².  His blood sugar in clinic today is:   Lab Results   Component Value Date    POCGLU 426 (H) 03/07/2020       Labs reviewed and are noted below.  His most recent A1C is:  Lab Results   Component Value Date    HGBA1C 10.6 (H) 03/07/2020     No results found for: CPEPTIDE  No results found for: GLUTAMICACID  Glucose   Date Value Ref Range Status   03/08/2020 287 (H) 70 - 110 mg/dL Final     Anion Gap   Date Value Ref Range Status   03/08/2020 8 8 - 16 mmol/L Final     eGFR if    Date Value Ref Range Status   03/08/2020 >60 >60 mL/min/1.73 m^2 Final     eGFR if non    Date Value Ref Range Status   03/08/2020 >60 >60 mL/min/1.73 m^2 Final     Comment:     Calculation used to obtain the estimated glomerular filtration  rate (eGFR) is the CKD-EPI equation.            CURRENT DM MEDICATIONS:   Diabetes Medications             insulin detemir U-100 (LEVEMIR FLEXTOUCH) 100 unit/mL (3 mL) SubQ InPn pen Inject 20 Units into the skin every evening.    metFORMIN (GLUCOPHAGE) 1000 MG tablet Take 1 tablet (1,000 mg total) by mouth 2 (two) times " daily with meals.      Hospital Medications         Diabetes Management Status    Statin: Not taking  ACE/ARB: Taking    Screening or Prevention Patient's value Goal Complete/Controlled?   HgA1C Testing and Control   Lab Results   Component Value Date    HGBA1C 10.6 (H) 03/07/2020      Annually/Less than 8% No   Lipid profile : 03/08/2020 Annually Yes   LDL control Lab Results   Component Value Date    LDLCALC 145.4 03/08/2020    Annually/Less than 100 mg/dl  No   Nephropathy screening No results found for: LABMICR  Lab Results   Component Value Date    PROTEINUA Negative 03/07/2020    Annually Yes   Blood pressure BP Readings from Last 1 Encounters:   03/09/20 (!) 152/92    Less than 140/90 No   Dilated retinal exam Most Recent Eye Exam Date: Not Found Annually No   Foot exam   Most Recent Foot Exam Date: Not Found Annually No     LIFESTYLE:  ACTIVITY LEVEL: Sedentary. EXERCISE: none  MEAL PLANNING: Patient reports number of meals per day to be 3 and number of snacks per day to be 3  BLOOD GLUCOSE TESTING: Patient is testing 0 times per day       Review of Systems   Constitutional: Positive for fatigue. Negative for activity change and appetite change.   Eyes: Positive for visual disturbance.   Gastrointestinal: Negative for constipation and diarrhea.   Endocrine: Positive for polydipsia, polyphagia and polyuria.   Neurological: Positive for weakness. Negative for syncope.   Psychiatric/Behavioral: Negative for confusion.         Objective:      Physical Exam   Constitutional: He is oriented to person, place, and time. He appears well-developed and well-nourished. No distress.   Neck: Normal range of motion.   Cardiovascular: Normal rate.   Pulmonary/Chest: Effort normal.   Musculoskeletal: Normal range of motion.   Neurological: He is alert and oriented to person, place, and time.   Skin: Skin is warm and dry. He is not diaphoretic.   Psychiatric: He has a normal mood and affect. His behavior is normal. Judgment  "and thought content normal.       Assessment:       1. New onset type 2 diabetes mellitus    2. Essential hypertension        Plan:   New onset type 2 diabetes mellitus  -     blood sugar diagnostic Strp; 1 each by Misc.(Non-Drug; Combo Route) route 3 (three) times daily. Insurance preferred  Dispense: 100 strip; Refill: 11  -     blood-glucose meter kit; Use as instructed. Insurance preferred  Dispense: 1 each; Refill: 0  -     lancets Misc; 1 each by Misc.(Non-Drug; Combo Route) route 3 (three) times daily. Insurance preferred  Dispense: 100 each; Refill: 11  -     Microalbumin/creatinine urine ratio; Future; Expected date: 03/09/2020  -     pen needle, diabetic 32 gauge x 5/32" Ndle; 1 each by Misc.(Non-Drug; Combo Route) route once daily.  Dispense: 100 each; Refill: 11  -     Ambulatory referral/consult to Podiatry; Future; Expected date: 03/16/2020  -     Ambulatory referral/consult to Optometry; Future; Expected date: 03/16/2020    Essential hypertension  Comments:  has not started Losartan         PLAN:   - Condition: uncontrolled    - Monitor blood glucose 2x daily, fasting and ac dinner or at bedtime. Goals reviewed, meter sent to pharmacy   - Diet reviewed   - Medication Changes:  Continue Metformin, start Levemir  - The patient was explained the above plan and given opportunity to ask questions.  He understands, chooses and consents to this plan and accepts all the risks, which include but are not limited to the risks mentioned above.   - Labs ordered as above  - Nurse visit: 1 week   - Follow up: 3 weeks    A total of 60 minutes was spent in face to face time, of which over 50% was spent in counseling patient on disease process, complications, treatment, and side effects of medications.  "

## 2020-03-09 NOTE — PATIENT INSTRUCTIONS
PATIENT INSTRUCTIONS  - Follow up as scheduled.   - Carb Count: 30-45G per meal and 15G per snack  - Exercise: Goal is 150 minutes or more per week  - Bring meter and blood sugar log to each appointment.     Medication instructions:   - Continue Metformin  - Start Levemir 20 units       - Blood Sugar Goals:  1. The goal for fasting blood sugars is 80 -130 mg/dl.   2. The goal for the 2 hour after meal blood sugars is below 180 mg/dl.  3. Blood sugars below 70 are considered LOW and you must eat or drink 15G of carbohydrates immediately, then recheck blood sugar after 15 minutes to ensure blood sugar has returned to normal range, (70 or above)

## 2020-03-16 ENCOUNTER — CLINICAL SUPPORT (OUTPATIENT)
Dept: DIABETES | Facility: CLINIC | Age: 40
End: 2020-03-16
Payer: MEDICAID

## 2020-03-16 DIAGNOSIS — E11.9 NEW ONSET TYPE 2 DIABETES MELLITUS: Primary | ICD-10-CM

## 2020-03-16 NOTE — PROGRESS NOTES
Contacted pt regarding BG. He is taking medications below. Fasting 200s,   Will increase Levemir to 26 units, NV in 1 week via telephone    Diabetes Medications             insulin detemir U-100 (LEVEMIR FLEXTOUCH) 100 unit/mL (3 mL) SubQ InPn pen Inject 20 Units into the skin every evening.    metFORMIN (GLUCOPHAGE) 1000 MG tablet Take 1 tablet (1,000 mg total) by mouth 2 (two) times daily with meals.

## 2020-03-20 ENCOUNTER — TELEPHONE (OUTPATIENT)
Dept: DIABETES | Facility: CLINIC | Age: 40
End: 2020-03-20

## 2020-03-20 DIAGNOSIS — E11.9 NEW ONSET TYPE 2 DIABETES MELLITUS: Primary | ICD-10-CM

## 2020-03-20 RX ORDER — METFORMIN HYDROCHLORIDE 1000 MG/1
1000 TABLET ORAL 2 TIMES DAILY WITH MEALS
Qty: 180 TABLET | Refills: 0 | Status: SHIPPED | OUTPATIENT
Start: 2020-03-20 | End: 2020-04-16 | Stop reason: SINTOL

## 2020-03-20 RX ORDER — INSULIN GLARGINE 300 [IU]/ML
32 INJECTION, SOLUTION SUBCUTANEOUS NIGHTLY
Qty: 5 ML | Refills: 0 | Status: SHIPPED | OUTPATIENT
Start: 2020-03-20 | End: 2020-05-04

## 2020-03-20 NOTE — TELEPHONE ENCOUNTER
Called pt to review BG, fastings, improved but still > 200  . Advised to increase Levemir to 32 units and continue Metformin. Refills will be sent. Will scheduled nv in 2 weeks via phone. Reschedule clinic visit for 4-6 weeks. Will have nurse sent opt a message regarding 3/30 visit- he would like to reschedule

## 2020-04-03 ENCOUNTER — TELEPHONE (OUTPATIENT)
Dept: DIABETES | Facility: CLINIC | Age: 40
End: 2020-04-03

## 2020-04-16 ENCOUNTER — TELEPHONE (OUTPATIENT)
Dept: DIABETES | Facility: CLINIC | Age: 40
End: 2020-04-16

## 2020-04-16 DIAGNOSIS — E11.9 NEW ONSET TYPE 2 DIABETES MELLITUS: Primary | ICD-10-CM

## 2020-04-16 RX ORDER — METFORMIN HYDROCHLORIDE 500 MG/1
1000 TABLET, EXTENDED RELEASE ORAL 2 TIMES DAILY WITH MEALS
Qty: 120 TABLET | Refills: 2 | Status: SHIPPED | OUTPATIENT
Start: 2020-04-16 | End: 2020-10-13 | Stop reason: SDUPTHER

## 2020-04-16 NOTE — TELEPHONE ENCOUNTER
Called back and advise patient that provider sent in a new script of the ER Metformin for him. Advise patient to try to eat healthier meals with less fat. Avoid fried food. Advise him to call back if he has any more complications.     ----- Message from Fernanda Jo sent at 4/16/2020  9:42 AM CDT -----  Contact: pt  Type:  Needs Medical Advice    Who Called: pt  Symptoms (please be specific): severe stomach pain caused by medication (metFORMIN (GLUCOPHAGE) 1000 MG tablet)  How long has patient had these symptoms: n/a  Pharmacy name and phone #:   [facility] #[id number] - [place] - [address] [facility]  [address] [facility]  [place] [address]  Phone: [phone] Fax: [phone]  [facility][id number]  [place] - [address] AT [facility]  [address]  [place] [address]  Phone: [phone] Fax: [phone]  Would the patient rather a call back or a response via [facility]? Call back  Best Call Back Number: [phone] (home)   Additional Information: pt is requesting to have his Rx replaced due to the reaction that he's having

## 2020-04-27 RX ORDER — LOSARTAN POTASSIUM 25 MG/1
TABLET ORAL
Qty: 30 TABLET | Refills: 1 | OUTPATIENT
Start: 2020-04-27

## 2020-04-30 ENCOUNTER — TELEPHONE (OUTPATIENT)
Dept: DIABETES | Facility: CLINIC | Age: 40
End: 2020-04-30

## 2020-04-30 NOTE — TELEPHONE ENCOUNTER
Remind patient about his appointment nirmala. Pt is aware that it is a audio visit and verbalized understanding.

## 2020-05-04 DIAGNOSIS — E11.9 NEW ONSET TYPE 2 DIABETES MELLITUS: ICD-10-CM

## 2020-05-04 RX ORDER — INSULIN GLARGINE 300 [IU]/ML
INJECTION, SOLUTION SUBCUTANEOUS
Qty: 1 SYRINGE | Refills: 0 | Status: SHIPPED | OUTPATIENT
Start: 2020-05-04 | End: 2020-10-06

## 2020-05-04 NOTE — TELEPHONE ENCOUNTER
Called patient to remind him of his audio appointment. No response. Left voicemail regarding this.

## 2020-05-08 RX ORDER — LOSARTAN POTASSIUM 25 MG/1
TABLET ORAL
Qty: 30 TABLET | Refills: 1 | OUTPATIENT
Start: 2020-05-08

## 2020-06-08 ENCOUNTER — HOSPITAL ENCOUNTER (EMERGENCY)
Facility: HOSPITAL | Age: 40
Discharge: HOME | End: 2020-06-08
Attending: EMERGENCY MEDICINE
Payer: MEDICAID

## 2020-06-08 VITALS
SYSTOLIC BLOOD PRESSURE: 156 MMHG | DIASTOLIC BLOOD PRESSURE: 91 MMHG | RESPIRATION RATE: 16 BRPM | BODY MASS INDEX: 40.43 KG/M2 | OXYGEN SATURATION: 97 % | TEMPERATURE: 98 F | HEART RATE: 73 BPM | HEIGHT: 74 IN | WEIGHT: 315 LBS

## 2020-06-08 DIAGNOSIS — S61.216A LACERATION OF RIGHT LITTLE FINGER WITHOUT FOREIGN BODY WITHOUT DAMAGE TO NAIL, INITIAL ENCOUNTER: Primary | ICD-10-CM

## 2020-06-08 PROCEDURE — 12001 RPR S/N/AX/GEN/TRNK 2.5CM/<: CPT | Mod: F9

## 2020-06-08 PROCEDURE — 99283 EMERGENCY DEPT VISIT LOW MDM: CPT | Mod: 25

## 2020-06-08 RX ORDER — SULFAMETHOXAZOLE AND TRIMETHOPRIM 800; 160 MG/1; MG/1
1 TABLET ORAL 2 TIMES DAILY
Qty: 20 TABLET | Refills: 0 | Status: SHIPPED | OUTPATIENT
Start: 2020-06-08 | End: 2020-06-18

## 2020-06-08 NOTE — ED PROVIDER NOTES
Encounter Date: 6/8/2020       History     Chief Complaint   Patient presents with    Laceration     lac to 4th finger right hand when cut with a can.     The history is provided by the patient.   Laceration    The incident occurred just prior to arrival. Pain location: right 5th digit. The laceration is 1 cm in size. The laceration mechanism was a a metal edge. The pain is at a severity of 1/10. He reports no foreign bodies present. His tetanus status is UTD.     Review of patient's allergies indicates:   Allergen Reactions    Hydrocodone-acetaminophen Hives and Itching     Past Medical History:   Diagnosis Date    Abscess     GERD (gastroesophageal reflux disease)     Hypertension      No past surgical history on file.  Family History   Problem Relation Age of Onset    Hypertension Mother     Diabetes Mother     No Known Problems Father      Social History     Tobacco Use    Smoking status: Current Every Day Smoker     Packs/day: 1.00     Types: Cigarettes    Smokeless tobacco: Never Used   Substance Use Topics    Alcohol use: No    Drug use: No     Review of Systems   Constitutional: Negative for diaphoresis and fever.   HENT: Negative for sore throat.    Eyes: Negative for photophobia and redness.   Respiratory: Negative for shortness of breath.    Cardiovascular: Negative for chest pain.   Gastrointestinal: Negative for abdominal pain, constipation, diarrhea, nausea and vomiting.   Endocrine: Negative for polydipsia and polyphagia.   Genitourinary: Negative for dysuria and frequency.   Musculoskeletal: Negative for back pain.   Skin: Positive for wound. Negative for rash.   Neurological: Negative for weakness.   Hematological: Does not bruise/bleed easily.   Psychiatric/Behavioral: The patient is not nervous/anxious.    All other systems reviewed and are negative.      Physical Exam     Initial Vitals [06/08/20 1235]   BP Pulse Resp Temp SpO2   (!) 156/91 73 16 98 °F (36.7 °C) 97 %      MAP       --          Physical Exam    Nursing note and vitals reviewed.  Constitutional: He appears well-developed and well-nourished.   HENT:   Head: Normocephalic and atraumatic.   Right Ear: External ear normal.   Left Ear: External ear normal.   Nose: Nose normal.   Mouth/Throat: Oropharynx is clear and moist.   Eyes: Conjunctivae and EOM are normal. Pupils are equal, round, and reactive to light.   Neck: Normal range of motion. Neck supple.   Cardiovascular: Normal rate, regular rhythm, normal heart sounds and intact distal pulses.   Pulmonary/Chest: Breath sounds normal. No respiratory distress. He has no wheezes. He has no rhonchi. He has no rales.   Abdominal: Soft. Bowel sounds are normal. He exhibits no distension. There is no tenderness. There is no rebound and no guarding.   Musculoskeletal: Normal range of motion.        Right hand: He exhibits tenderness and laceration (1 cm in length, 1 mm in depth). He exhibits normal range of motion, no bony tenderness, normal two-point discrimination, normal capillary refill, no deformity and no swelling. Normal sensation noted. Normal strength noted.        Hands:  Neurological: He is alert and oriented to person, place, and time. He has normal strength.   Skin: Skin is warm and dry.   Psychiatric: He has a normal mood and affect. His behavior is normal. Judgment and thought content normal.         ED Course   Lac Repair  Date/Time: 6/8/2020 1:09 PM  Performed by: BLAIR Pablo  Authorized by: Andrés Cuadra MD   Body area: upper extremity  Location details: right small finger  Laceration length: 1 cm  Foreign bodies: no foreign bodies  Tendon involvement: none  Nerve involvement: none  Vascular damage: no  Skin closure: glue  Dressing: Steri-Strips and splint for protection  Patient tolerance: Patient tolerated the procedure well with no immediate complications        Labs Reviewed - No data to display       Imaging Results    None                                           Clinical Impression:       ICD-10-CM ICD-9-CM   1. Laceration of right little finger without foreign body without damage to nail, initial encounter S61.216A 883.0         Disposition:   Disposition: Discharged  Condition: Stable     ED Disposition Condition    Discharge Stable        ED Prescriptions     Medication Sig Dispense Start Date End Date Auth. Provider    sulfamethoxazole-trimethoprim 800-160mg (BACTRIM DS) 800-160 mg Tab Take 1 tablet by mouth 2 (two) times daily. for 10 days 20 tablet 6/8/2020 6/18/2020 BLAIR Pablo        Follow-up Information     Follow up With Specialties Details Why Contact Info    PCP  Go in 2 days                                       BLAIR Pablo  06/08/20 1436

## 2020-06-23 ENCOUNTER — TELEPHONE (OUTPATIENT)
Dept: DIABETES | Facility: CLINIC | Age: 40
End: 2020-06-23

## 2020-06-23 NOTE — TELEPHONE ENCOUNTER
----- Message from Alexis Miller LPN sent at 6/23/2020  8:44 AM CDT -----    ----- Message -----  From: Myra Clay  Sent: 6/23/2020   7:40 AM CDT  To: George Sen Staff    Pt called and stated he needs to be seen today to get a refill on insulin. He can be reached at [phone].    Thanks,  Tf

## 2020-09-25 ENCOUNTER — TELEPHONE (OUTPATIENT)
Dept: DIABETES | Facility: CLINIC | Age: 40
End: 2020-09-25

## 2020-09-25 NOTE — TELEPHONE ENCOUNTER
----- Message from Kandi Regan sent at 9/25/2020  8:14 AM CDT -----  Regarding: insulin  Contact: VANESSA Freire states that there was a mistake at pharmacy with refilling patient Robinamir, and she needs a new prescription sent in for it, please call her back at [phone]

## 2020-09-25 NOTE — TELEPHONE ENCOUNTER
----- Message from Kandi Regan sent at 9/25/2020  8:14 AM CDT -----  Regarding: insulin  Contact: VANESSA Freire states that there was a mistake at pharmacy with refilling patient Robinamir, and she needs a new prescription sent in for it, please call her back at [phone]

## 2020-09-25 NOTE — TELEPHONE ENCOUNTER
Contacted Mouna perez. Updated her that patient needs a f/u appointment scheduled for any additional refills. Voiced understanding.

## 2020-10-05 ENCOUNTER — TELEPHONE (OUTPATIENT)
Dept: DIABETES | Facility: CLINIC | Age: 40
End: 2020-10-05

## 2020-10-05 NOTE — TELEPHONE ENCOUNTER
Advise pt he needs to follow up for more refills. Follow up appointment scheduled.   ----- Message from Linda Louis sent at 10/5/2020 12:19 PM CDT -----  Regarding: insulin refills  Contact: pt  Pt has not had his insulin in 2 months. Pt has new pharmacy.    1. What is the name of the medication you are requesting? Doesn't know name of insulin  2. What is the dose? .  3. How do you take the medication? Orally, topically, etc? .  4. How often do you take this medication? .  5. Do you need a 30 day or 90 day supply? .  6. How many refills are you requesting? .  7. What is your preferred pharmacy and location of the pharmacy? [facility] [address] in   8. Who can we contact with further questions? [phone]

## 2020-10-06 RX ORDER — INSULIN DETEMIR 100 [IU]/ML
50 INJECTION, SOLUTION SUBCUTANEOUS NIGHTLY
Qty: 3 ML | Refills: 0 | Status: SHIPPED | OUTPATIENT
Start: 2020-10-06 | End: 2020-10-06 | Stop reason: SDUPTHER

## 2020-10-06 RX ORDER — INSULIN DETEMIR 100 [IU]/ML
50 INJECTION, SOLUTION SUBCUTANEOUS NIGHTLY
Qty: 3 ML | Refills: 0 | Status: SHIPPED | OUTPATIENT
Start: 2020-10-06 | End: 2020-10-13 | Stop reason: ALTCHOICE

## 2020-10-06 NOTE — TELEPHONE ENCOUNTER
Spoke with John at [facility]. Said box cannot be broken down there, but [facility] can fill Rx for 1 syringe. Contacted patient to found out which [facility] he prefers Rx to go to.

## 2020-10-06 NOTE — TELEPHONE ENCOUNTER
----- Message from Simona Rosario sent at 10/6/2020 11:51 AM CDT -----  .Type:  Pharmacy Calling to Clarify an RX    Name of Caller: John   Pharmacy Name: [facility]   Prescription Name: insulin detemir U-100 (LEVEMIR FLEXTOUCH U-100 INSULN) 100 unit/mL (3 mL) InPn pen  What do they need to clarify?: needs 15 MML a full box   Best Call Back Number: [phone]  Additional Information: n/a

## 2020-10-06 NOTE — TELEPHONE ENCOUNTER
----- Message from Los Morse sent at 10/6/2020 10:18 AM CDT -----  Type:  Needs Medical Advice    Who Called: Pt  Symptoms (please be specific):    How long has patient had these symptoms:    Pharmacy name and phone #:    Would the patient rather a call back or a response via [facility]? Call back  Best Call Back Number: [phone] (home)   Additional Information:   Pt needs a call back from nurse, states that his sugar is high. Please call back

## 2020-10-06 NOTE — TELEPHONE ENCOUNTER
Contacted patient back. Said BG's have been extremely elevated, > 500. Requests Rx for insulin until his next appt.

## 2020-10-13 ENCOUNTER — OFFICE VISIT (OUTPATIENT)
Dept: DIABETES | Facility: CLINIC | Age: 40
End: 2020-10-13
Payer: MEDICAID

## 2020-10-13 ENCOUNTER — LAB VISIT (OUTPATIENT)
Dept: LAB | Facility: HOSPITAL | Age: 40
End: 2020-10-13
Attending: NURSE PRACTITIONER
Payer: MEDICAID

## 2020-10-13 ENCOUNTER — TELEPHONE (OUTPATIENT)
Dept: DIABETES | Facility: CLINIC | Age: 40
End: 2020-10-13

## 2020-10-13 VITALS
WEIGHT: 315 LBS | DIASTOLIC BLOOD PRESSURE: 88 MMHG | HEART RATE: 81 BPM | BODY MASS INDEX: 40.43 KG/M2 | SYSTOLIC BLOOD PRESSURE: 136 MMHG | HEIGHT: 74 IN

## 2020-10-13 DIAGNOSIS — E11.69 TYPE 2 DIABETES MELLITUS WITH OTHER SPECIFIED COMPLICATION, UNSPECIFIED WHETHER LONG TERM INSULIN USE: Primary | ICD-10-CM

## 2020-10-13 DIAGNOSIS — E11.69 TYPE 2 DIABETES MELLITUS WITH OTHER SPECIFIED COMPLICATION, UNSPECIFIED WHETHER LONG TERM INSULIN USE: ICD-10-CM

## 2020-10-13 DIAGNOSIS — E11.9 NEW ONSET TYPE 2 DIABETES MELLITUS: ICD-10-CM

## 2020-10-13 DIAGNOSIS — I10 ESSENTIAL HYPERTENSION: ICD-10-CM

## 2020-10-13 LAB
CHOLEST SERPL-MCNC: 183 MG/DL (ref 120–199)
CHOLEST/HDLC SERPL: 5.4 {RATIO} (ref 2–5)
ESTIMATED AVG GLUCOSE: 220 MG/DL (ref 68–131)
GLUCOSE SERPL-MCNC: 245 MG/DL (ref 70–110)
HBA1C MFR BLD HPLC: 9.3 % (ref 4–5.6)
HDLC SERPL-MCNC: 34 MG/DL (ref 40–75)
HDLC SERPL: 18.6 % (ref 20–50)
LDLC SERPL CALC-MCNC: 127.2 MG/DL (ref 63–159)
NONHDLC SERPL-MCNC: 149 MG/DL
TRIGL SERPL-MCNC: 109 MG/DL (ref 30–150)

## 2020-10-13 PROCEDURE — 99999 PR PBB SHADOW E&M-EST. PATIENT-LVL IV: ICD-10-PCS | Mod: PBBFAC,,, | Performed by: NURSE PRACTITIONER

## 2020-10-13 PROCEDURE — 99214 PR OFFICE/OUTPT VISIT, EST, LEVL IV, 30-39 MIN: ICD-10-PCS | Mod: S$PBB,,, | Performed by: NURSE PRACTITIONER

## 2020-10-13 PROCEDURE — 82962 GLUCOSE BLOOD TEST: CPT | Mod: PBBFAC | Performed by: NURSE PRACTITIONER

## 2020-10-13 PROCEDURE — 99214 OFFICE O/P EST MOD 30 MIN: CPT | Mod: PBBFAC | Performed by: NURSE PRACTITIONER

## 2020-10-13 PROCEDURE — 83036 HEMOGLOBIN GLYCOSYLATED A1C: CPT

## 2020-10-13 PROCEDURE — 36415 COLL VENOUS BLD VENIPUNCTURE: CPT

## 2020-10-13 PROCEDURE — 80061 LIPID PANEL: CPT

## 2020-10-13 PROCEDURE — 99999 PR PBB SHADOW E&M-EST. PATIENT-LVL IV: CPT | Mod: PBBFAC,,, | Performed by: NURSE PRACTITIONER

## 2020-10-13 PROCEDURE — 99214 OFFICE O/P EST MOD 30 MIN: CPT | Mod: S$PBB,,, | Performed by: NURSE PRACTITIONER

## 2020-10-13 RX ORDER — METFORMIN HYDROCHLORIDE 500 MG/1
1000 TABLET, EXTENDED RELEASE ORAL 2 TIMES DAILY WITH MEALS
Qty: 360 TABLET | Refills: 0 | Status: SHIPPED | OUTPATIENT
Start: 2020-10-13 | End: 2021-01-04

## 2020-10-13 RX ORDER — INSULIN GLARGINE 300 U/ML
50 INJECTION, SOLUTION SUBCUTANEOUS NIGHTLY
Qty: 2 SYRINGE | Refills: 1 | Status: SHIPPED | OUTPATIENT
Start: 2020-10-13 | End: 2020-11-10 | Stop reason: SDUPTHER

## 2020-10-13 NOTE — PROGRESS NOTES
"Subjective:         Patient ID: Tee Shrestha is a 40 y.o. male.  Patient's current PCP is [facility]       Chief Complaint: Diabetes Mellitus    HPI  Tee Shrestha is a 40 y.o. Black or  male presenting for a follow up for diabetes. Has not followed up since March. Patient has had DM for < 1 y.   He has the following complications related to diabetes:  HTN, Hyperlipidemia. Past failed treatment include: none.  Blood glucose testing is performed. BG have been very high due to running out of insulin for 2 months. The patient reports diet noncompliance much of the time. He reports recent hospital admissions, reports emergency room visits, denies hypoglycemia.      Height: 6' 2" (188 cm)  //  Weight: (!) 163.2 kg (359 lb 12.7 oz), Body mass index is 46.19 kg/m².  His blood sugar in clinic today is:   Lab Results   Component Value Date    POCGLU 245 (A) 10/13/2020       Labs reviewed and are noted below.  His most recent A1C is:  Lab Results   Component Value Date    HGBA1C 10.6 (H) 03/10/2020    HGBA1C 10.6 (H) 03/07/2020     No results found for: CPEPTIDE  No results found for: GLUTAMICACID  Glucose   Date Value Ref Range Status   03/08/2020 287 (H) 70 - 110 mg/dL Final     Anion Gap   Date Value Ref Range Status   03/08/2020 8 8 - 16 mmol/L Final     eGFR if    Date Value Ref Range Status   03/08/2020 >60 >60 mL/min/1.73 m^2 Final     eGFR if non    Date Value Ref Range Status   03/08/2020 >60 >60 mL/min/1.73 m^2 Final     Comment:     Calculation used to obtain the estimated glomerular filtration  rate (eGFR) is the CKD-EPI equation.            CURRENT DM MEDICATIONS:   Diabetes Medications             insulin detemir U-100 (LEVEMIR FLEXTOUCH U-100 INSULN) 100 unit/mL (3 mL) InPn pen Inject 50 Units into the skin every evening.     metFORMIN (GLUCOPHAGE-XR) 500 MG XR 24hr tablet Take 2 tablets (1,000 mg total) by mouth 2 (two) times " daily with meals.          Diabetes Management Status    Statin: Not taking  ACE/ARB: Taking    Screening or Prevention Patient's value Goal Complete/Controlled?   HgA1C Testing and Control   Lab Results   Component Value Date    HGBA1C 10.6 (H) 03/10/2020      Annually/Less than 8% No   Lipid profile : 03/08/2020 Annually Yes   LDL control Lab Results   Component Value Date    LDLCALC 145.4 03/08/2020    Annually/Less than 100 mg/dl  No   Nephropathy screening Lab Results   Component Value Date    LABMICR <2.5 03/09/2020     Lab Results   Component Value Date    PROTEINUA Negative 03/07/2020    Annually Yes   Blood pressure BP Readings from Last 1 Encounters:   10/13/20 136/88    Less than 140/90 No   Dilated retinal exam Most Recent Eye Exam Date: Not Found Annually No   Foot exam   : 03/09/2020 Annually No     LIFESTYLE:  ACTIVITY LEVEL: Sedentary. EXERCISE: none  MEAL PLANNING: Patient reports number of meals per day to be 3 and number of snacks per day to be 3  BLOOD GLUCOSE TESTING: Patient is testing 1 times per day       Review of Systems   Constitutional: Positive for fatigue. Negative for activity change and appetite change.   Gastrointestinal: Negative for constipation and diarrhea.   Endocrine: Positive for polydipsia, polyphagia and polyuria.   Neurological: Negative for syncope and weakness.   Psychiatric/Behavioral: Negative for confusion.         Objective:      Physical Exam  Constitutional:       General: He is not in acute distress.     Appearance: He is well-developed. He is not diaphoretic.   Neck:      Musculoskeletal: Normal range of motion.   Cardiovascular:      Rate and Rhythm: Normal rate.   Pulmonary:      Effort: Pulmonary effort is normal.   Musculoskeletal: Normal range of motion.   Skin:     General: Skin is warm and dry.   Neurological:      Mental Status: He is alert and oriented to person, place, and time.   Psychiatric:         Behavior: Behavior normal.         Thought Content:  Thought content normal.         Judgment: Judgment normal.         Assessment:       1. Type 2 diabetes mellitus with other specified complication, unspecified whether long term insulin use    2. Essential hypertension        Plan:   Type 2 diabetes mellitus with other specified complication, unspecified whether long term insulin use  -     POCT Glucose, Hand-Held Device  -     Hemoglobin A1C; Future; Expected date: 10/13/2020  -     Lipid Panel; Future; Expected date: 10/13/2020    Essential hypertension  Comments:  advised to increase Losartan to 50 mg         PLAN:   - Condition: uncontrolled    - Monitor blood glucose 2x daily, fasting and ac dinner or at bedtime. Goals reviewed  - Diet reviewed   - Medication Changes:  Increase Metformin to 2000 mg daily, Finish Levemir- then start Toujeo at 50 units . There was confusion at the pharmacy regarding Toujeo- he was given Toujeo at some point, he reports taking 50 units  - The patient was explained the above plan and given opportunity to ask questions.  He understands, chooses and consents to this plan and accepts all the risks, which include but are not limited to the risks mentioned above.   - Labs ordered as above  - Nurse visit: deferred  - Follow up: 3 weeks    A total of 30 minutes was spent in face to face time, of which over 50% was spent in counseling patient on disease process, complications, treatment, and side effects of medications.

## 2020-10-13 NOTE — PATIENT INSTRUCTIONS
PATIENT INSTRUCTIONS  - Follow up as scheduled.   - Carb Count: 30-45G per meal and 15G per snack  - Exercise: Goal is 150 minutes or more per week  - Bring meter and blood sugar log to each appointment.     Medication instructions:   - increase Metformin to 2 tablets twice daily  - finish Levemir, then start Toujeo      - Blood Sugar Goals:  1. The goal for fasting blood sugars is 80 -130 mg/dl.   2. The goal for the 2 hour after meal blood sugars is below 180 mg/dl.  3. Blood sugars below 70 are considered LOW and you must eat or drink 15G of carbohydrates immediately, then recheck blood sugar after 15 minutes to ensure blood sugar has returned to normal range, (70 or above)

## 2020-10-13 NOTE — TELEPHONE ENCOUNTER
----- Message from Jessy Trimble NP sent at 10/13/2020  7:52 AM CDT -----  Make sure Toujeo went through at pharmacy - he has taken it before, discontinue Levemir

## 2020-10-13 NOTE — TELEPHONE ENCOUNTER
Contacted pt's insurance company. [employer] has an approved PA on file, authorization will not  until 3/20/2021.

## 2020-10-15 ENCOUNTER — TELEPHONE (OUTPATIENT)
Dept: FAMILY MEDICINE | Facility: CLINIC | Age: 40
End: 2020-10-15

## 2020-10-15 NOTE — TELEPHONE ENCOUNTER
----- Message from Camron COWAN LPN sent at 10/15/2020  8:23 AM CDT -----  Hi,  This patient missed an appointment yesterday. Can you assist in rescheduling another appointment for him? Thank you  ----- Message -----  From: Dwight Morris  Sent: 10/15/2020   8:17 AM CDT  To: [facility] Staff    Pt would like return call regarding primary care referral, pt was referred to primary care, but missed appt, and is needing another appt with primary care.  Please call [phone].  Md Mohinder

## 2020-10-27 ENCOUNTER — PATIENT OUTREACH (OUTPATIENT)
Dept: ADMINISTRATIVE | Facility: HOSPITAL | Age: 40
End: 2020-10-27

## 2020-11-10 ENCOUNTER — OFFICE VISIT (OUTPATIENT)
Dept: DIABETES | Facility: CLINIC | Age: 40
End: 2020-11-10
Payer: MEDICAID

## 2020-11-10 VITALS — SYSTOLIC BLOOD PRESSURE: 150 MMHG | DIASTOLIC BLOOD PRESSURE: 94 MMHG

## 2020-11-10 DIAGNOSIS — E11.69 TYPE 2 DIABETES MELLITUS WITH OTHER SPECIFIED COMPLICATION, UNSPECIFIED WHETHER LONG TERM INSULIN USE: Primary | ICD-10-CM

## 2020-11-10 DIAGNOSIS — I10 ESSENTIAL HYPERTENSION: ICD-10-CM

## 2020-11-10 DIAGNOSIS — E11.9 NEW ONSET TYPE 2 DIABETES MELLITUS: ICD-10-CM

## 2020-11-10 LAB — GLUCOSE SERPL-MCNC: 115 MG/DL (ref 70–110)

## 2020-11-10 PROCEDURE — 99214 PR OFFICE/OUTPT VISIT, EST, LEVL IV, 30-39 MIN: ICD-10-PCS | Mod: 95,,, | Performed by: NURSE PRACTITIONER

## 2020-11-10 PROCEDURE — 82962 POCT GLUCOSE, HAND-HELD DEVICE: ICD-10-PCS | Mod: ,,, | Performed by: NURSE PRACTITIONER

## 2020-11-10 PROCEDURE — 82962 GLUCOSE BLOOD TEST: CPT | Mod: ,,, | Performed by: NURSE PRACTITIONER

## 2020-11-10 PROCEDURE — 99214 OFFICE O/P EST MOD 30 MIN: CPT | Mod: 95,,, | Performed by: NURSE PRACTITIONER

## 2020-11-10 RX ORDER — LOSARTAN POTASSIUM 100 MG/1
100 TABLET ORAL DAILY
Qty: 30 TABLET | Refills: 0 | Status: SHIPPED | OUTPATIENT
Start: 2020-11-10 | End: 2020-11-27 | Stop reason: SDUPTHER

## 2020-11-10 RX ORDER — INSULIN GLARGINE 300 U/ML
50 INJECTION, SOLUTION SUBCUTANEOUS NIGHTLY
Qty: 15 ML | Refills: 0 | Status: SHIPPED | OUTPATIENT
Start: 2020-11-10 | End: 2021-02-08

## 2020-11-10 NOTE — PATIENT INSTRUCTIONS
PATIENT INSTRUCTIONS  - Follow up as scheduled.   - Carb Count: 30-45G per meal and 15G per snack  - Exercise: Goal is 150 minutes or more per week  - Bring meter and blood sugar log to each appointment.     Medication instructions:   - continue Metformin and Toujeo    - I will schedule a nurse visit in 6 weeks, send your blood sugars on Mychart. You do not have to come to the clinic    - Blood Sugar Goals:  1. The goal for fasting blood sugars is 80 -130 mg/dl.   2. The goal for the 2 hour after meal blood sugars is below 180 mg/dl.  3. Blood sugars below 70 are considered LOW and you must eat or drink 15G of carbohydrates immediately, then recheck blood sugar after 15 minutes to ensure blood sugar has returned to normal range, (70 or above)

## 2020-11-10 NOTE — PROGRESS NOTES
Subjective:         Patient ID: Tee Shrestha is a 40 y.o. male.  Patient's current PCP is [facility]       Chief Complaint: Diabetes Mellitus    HPI  Tee Shrestha is a 40 y.o. Black or  male presenting for a follow up for diabetes. Patient has had DM for < 1 y.   He has the following complications related to diabetes:  HTN, Hyperlipidemia. Past failed treatment include: none.  Blood glucose testing is performed. BG have been  fasting per pt recall. He reports recent hospital admissions, reports emergency room visits, denies hypoglycemia.         //   , There is no height or weight on file to calculate BMI.  His blood sugar in clinic today is:   Lab Results   Component Value Date    POCGLU 245 (A) 10/13/2020       Labs reviewed and are noted below.  His most recent A1C is:  Lab Results   Component Value Date    HGBA1C 9.3 (H) 10/13/2020    HGBA1C 10.6 (H) 03/10/2020    HGBA1C 10.6 (H) 03/07/2020     No results found for: CPEPTIDE  No results found for: GLUTAMICACID  Glucose   Date Value Ref Range Status   03/08/2020 287 (H) 70 - 110 mg/dL Final     Anion Gap   Date Value Ref Range Status   03/08/2020 8 8 - 16 mmol/L Final     eGFR if    Date Value Ref Range Status   03/08/2020 >60 >60 mL/min/1.73 m^2 Final     eGFR if non    Date Value Ref Range Status   03/08/2020 >60 >60 mL/min/1.73 m^2 Final     Comment:     Calculation used to obtain the estimated glomerular filtration  rate (eGFR) is the CKD-EPI equation.            CURRENT DM MEDICATIONS:   Diabetes Medications             insulin glargine, TOUJEO, (TOUJEO SOLOSTAR U-300 INSULIN) 300 unit/mL (1.5 mL) InPn pen Inject 50 Units into the skin every evening. To replace Levemir    metFORMIN (GLUCOPHAGE-XR) 500 MG ER 24hr tablet Take 2 tablets (1,000 mg total) by mouth 2 (two) times daily with meals.            Diabetes Management Status    Statin: Not taking  ACE/ARB:  Taking    Screening or Prevention Patient's value Goal Complete/Controlled?   HgA1C Testing and Control   Lab Results   Component Value Date    HGBA1C 9.3 (H) 10/13/2020      Annually/Less than 8% No   Lipid profile : 10/13/2020 Annually Yes   LDL control Lab Results   Component Value Date    LDLCALC 127.2 10/13/2020    Annually/Less than 100 mg/dl  No   Nephropathy screening Lab Results   Component Value Date    LABMICR <2.5 03/09/2020     Lab Results   Component Value Date    PROTEINUA Negative 03/07/2020    Annually Yes   Blood pressure BP Readings from Last 1 Encounters:   11/10/20 (!) 150/94    Less than 140/90 No   Dilated retinal exam Most Recent Eye Exam Date: Not Found Annually No   Foot exam   : 03/09/2020 Annually No     LIFESTYLE:  ACTIVITY LEVEL: Sedentary. EXERCISE: none  MEAL PLANNING: Patient reports number of meals per day to be 3 and number of snacks per day to be 3  BLOOD GLUCOSE TESTING: Patient is testing 1 times per day       Review of Systems   Constitutional: Negative for activity change, appetite change and fatigue.   Gastrointestinal: Negative for constipation and diarrhea.   Endocrine: Negative for polydipsia, polyphagia and polyuria.   Neurological: Negative for syncope and weakness.   Psychiatric/Behavioral: Negative for confusion.         Objective:      Physical Exam  Constitutional:       General: He is not in acute distress.     Appearance: He is well-developed. He is not diaphoretic.   Neck:      Musculoskeletal: Normal range of motion.   Cardiovascular:      Rate and Rhythm: Normal rate.   Pulmonary:      Effort: Pulmonary effort is normal.   Musculoskeletal: Normal range of motion.   Skin:     General: Skin is warm and dry.   Neurological:      Mental Status: He is alert and oriented to person, place, and time.   Psychiatric:         Behavior: Behavior normal.         Thought Content: Thought content normal.         Judgment: Judgment normal.         Assessment:       1. Type 2  diabetes mellitus with other specified complication, unspecified whether long term insulin use    2. New onset type 2 diabetes mellitus    3. Essential hypertension        Plan:   Type 2 diabetes mellitus with other specified complication, unspecified whether long term insulin use  -     Hemoglobin A1C; Future; Expected date: 11/10/2020  -     POCT Glucose, Hand-Held Device  -     losartan (COZAAR) 100 MG tablet; Take 1 tablet (100 mg total) by mouth once daily.  Dispense: 30 tablet; Refill: 0    New onset type 2 diabetes mellitus  -     insulin glargine, TOUJEO, (TOUJEO SOLOSTAR U-300 INSULIN) 300 unit/mL (1.5 mL) InPn pen; Inject 50 Units into the skin every evening. To replace Levemir  Dispense: 15 mL; Refill: 0    Essential hypertension  Comments:  Losartan increased at last visit, no showed PCP, will make adjustments again and reschedule with PCP        PLAN:   - Condition: uncontrolled    - Monitor blood glucose 2x daily, fasting and ac dinner or at bedtime. Goals reviewed  - Diet reviewed   - Medication Changes:  Continue Metformin 2000 mg daily, Continue Toujeo at 50 units   - The patient was explained the above plan and given opportunity to ask questions.  He understands, chooses and consents to this plan and accepts all the risks, which include but are not limited to the risks mentioned above.   - Labs ordered as above  - Nurse visit: 6 weeks via [facility] to review BG   - Follow up: 3 months    A total of 30 minutes was spent in face to face time, of which over 50% was spent in counseling patient on disease process, complications, treatment, and side effects of medications.

## 2020-11-27 ENCOUNTER — OFFICE VISIT (OUTPATIENT)
Dept: FAMILY MEDICINE | Facility: CLINIC | Age: 40
End: 2020-11-27
Payer: MEDICAID

## 2020-11-27 VITALS
HEIGHT: 74 IN | DIASTOLIC BLOOD PRESSURE: 100 MMHG | OXYGEN SATURATION: 98 % | HEART RATE: 78 BPM | TEMPERATURE: 98 F | SYSTOLIC BLOOD PRESSURE: 152 MMHG | BODY MASS INDEX: 40.43 KG/M2 | WEIGHT: 315 LBS

## 2020-11-27 DIAGNOSIS — I10 ESSENTIAL HYPERTENSION: Primary | ICD-10-CM

## 2020-11-27 DIAGNOSIS — E11.69 TYPE 2 DIABETES MELLITUS WITH OTHER SPECIFIED COMPLICATION, UNSPECIFIED WHETHER LONG TERM INSULIN USE: ICD-10-CM

## 2020-11-27 DIAGNOSIS — L03.011 PARONYCHIA OF FINGER, RIGHT: ICD-10-CM

## 2020-11-27 DIAGNOSIS — M54.50 CHRONIC RIGHT-SIDED LOW BACK PAIN WITHOUT SCIATICA: ICD-10-CM

## 2020-11-27 DIAGNOSIS — L73.2 HIDRADENITIS SUPPURATIVA: ICD-10-CM

## 2020-11-27 DIAGNOSIS — G89.29 CHRONIC RIGHT-SIDED LOW BACK PAIN WITHOUT SCIATICA: ICD-10-CM

## 2020-11-27 PROCEDURE — 99215 PR OFFICE/OUTPT VISIT, EST, LEVL V, 40-54 MIN: ICD-10-PCS | Mod: S$PBB,,, | Performed by: FAMILY MEDICINE

## 2020-11-27 PROCEDURE — 99999 PR PBB SHADOW E&M-EST. PATIENT-LVL IV: CPT | Mod: PBBFAC,,, | Performed by: FAMILY MEDICINE

## 2020-11-27 PROCEDURE — 99214 OFFICE O/P EST MOD 30 MIN: CPT | Mod: PBBFAC,PO | Performed by: FAMILY MEDICINE

## 2020-11-27 PROCEDURE — 99215 OFFICE O/P EST HI 40 MIN: CPT | Mod: S$PBB,,, | Performed by: FAMILY MEDICINE

## 2020-11-27 PROCEDURE — 99999 PR PBB SHADOW E&M-EST. PATIENT-LVL IV: ICD-10-PCS | Mod: PBBFAC,,, | Performed by: FAMILY MEDICINE

## 2020-11-27 RX ORDER — METFORMIN HYDROCHLORIDE 1000 MG/1
1000 TABLET ORAL 2 TIMES DAILY WITH MEALS
COMMUNITY
Start: 2020-09-27 | End: 2021-05-19

## 2020-11-27 RX ORDER — METHOCARBAMOL 750 MG/1
750 TABLET, FILM COATED ORAL 3 TIMES DAILY PRN
Qty: 30 TABLET | Refills: 0 | Status: SHIPPED | OUTPATIENT
Start: 2020-11-27 | End: 2020-12-07

## 2020-11-27 RX ORDER — AMLODIPINE BESYLATE 10 MG/1
10 TABLET ORAL DAILY
COMMUNITY
Start: 2020-10-11 | End: 2020-11-27 | Stop reason: SDUPTHER

## 2020-11-27 RX ORDER — LOSARTAN POTASSIUM 100 MG/1
100 TABLET ORAL DAILY
Qty: 30 TABLET | Refills: 5 | Status: SHIPPED | OUTPATIENT
Start: 2020-11-27 | End: 2022-05-03 | Stop reason: SDUPTHER

## 2020-11-27 RX ORDER — DICLOFENAC SODIUM 10 MG/G
2 GEL TOPICAL 4 TIMES DAILY
Qty: 100 G | Refills: 4 | Status: SHIPPED | OUTPATIENT
Start: 2020-11-27 | End: 2021-05-19

## 2020-11-27 RX ORDER — DOXYCYCLINE 100 MG/1
100 CAPSULE ORAL 2 TIMES DAILY
Qty: 20 CAPSULE | Refills: 0 | Status: SHIPPED | OUTPATIENT
Start: 2020-11-27 | End: 2020-12-07

## 2020-11-27 RX ORDER — AMLODIPINE BESYLATE 10 MG/1
10 TABLET ORAL DAILY
Qty: 30 TABLET | Refills: 5 | Status: SHIPPED | OUTPATIENT
Start: 2020-11-27 | End: 2022-09-22 | Stop reason: SDUPTHER

## 2020-11-27 RX ORDER — PRAVASTATIN SODIUM 40 MG/1
40 TABLET ORAL DAILY
COMMUNITY
Start: 2020-10-06 | End: 2021-05-19 | Stop reason: SDUPTHER

## 2020-11-27 NOTE — PATIENT INSTRUCTIONS
Discharge Instructions: Taking Your Blood Pressure  Blood pressure is the force of blood as it moves from the heart through the blood vessels. You can take your own blood pressure reading using a digital monitor. Take readings as often as your healthcare provider instructs. Take your readings each time in the same way, using the same arm. Here are guidelines for taking your blood pressure.  The American Heart Association (AHA) recommends purchasing a blood pressure monitor that is validated and approved by the Association for the Advancement of Medical Instrumentation, the [place] Hypertension Society, and the International Protocol for the Validation of Automated BP Measuring Devices. If the blood pressure monitor is for a senior adult, a pregnant woman, or a child, make certain it is validated for use with such a population. For the most reliable readings, the AHA recommends an automatic, cuff-style, upper arm (bicep) monitor. The readings from finger and wrist monitors are not as reliable as the upper arm monitor.        Step 1. Relax    · Wait at least a half hour after smoking, eating, or exercising. Do not drink coffee, tea, soda, or other caffeinated beverages before checking your blood pressure.   · Sit comfortably at a table. Place the monitor near you.  · Rest for a few minutes before you begin.        Step 2. Wrap the cuff    · Place your arm on the table, palm up. Put your arm in a position that is level with your heart. Wrap the cuff around your upper arm, about an inch above your elbow. Its best to wrap the cuff on bare skin, not over clothing.  · Make sure your cuff fits. If it doesnt wrap around your upper arm, order a larger cuff. A cuff that is too large or too small can result in an inaccurate blood pressure reading.           Step 3. Inflate the cuff    · Pump the cuff until the scale reads 200. If you have a self-inflating cuff, push the button that starts the pump.  · The cuff will  tighten, then loosen.  · The numbers will change. When they stop changing, your blood pressure reading will appear.  · If you get a reading that is too high or too low for you, relax for a few minutes. Then do the test again.    Step 4. Write down the results  · Write down your blood pressure numbers. Kevin the date and time. Keep your results in one place, such as a notebook.  · Remove the cuff from your arm. Turn off the machine.  · Take the readings with you to your medical appointments.  · If you start a new blood pressure medicine, or change a blood pressure medicine dose, note the day you started the new drug or dosage on your blood pressure recording sheet. This will help your healthcare provider monitor the effect of medication changes.     Date Last Reviewed: 4/27/2016  © 8354-2177 [employer]. [address] [place] [address]. All rights reserved. This information is not intended as a substitute for professional medical care. Always follow your healthcare professional's instructions.        Understanding Hidradenitis Suppurativa  Hidradenitis suppurativa is a long-term (chronic) skin disease. It causes painful bumps and sores (abscesses) to form around a hair follicle. Follicles are the tiny holes from which hair grows out of your skin. The disease occurs on parts of the body where skin rubs together. It most often appears in the armpits, the groin area, and under the breasts. It is more common in women.  How to say it  [id number]   What causes hidradenitis suppurativa?  This skin disease happens when hair follicles become clogged with keratin. Keratin is the protein that makes up your hair and nails. The follicles then burst and become infected. Pressure or rubbing on the skin can clog the follicles. Or it can further irritate them.  The disease tends to run in families. Its also more likely to occur in people who are obese, have diabetes, or smoke.  Hormones may also play a part.  Symptoms of hidradenitis suppurativa  This skin disease causes one or more painful red bumps on the skin. These bumps become infected and drain pus. They may also itch or burn. In severe cases, sinus tracts may form. These are narrow channels that run under the skin. Blood or a bad-smelling pus may ooze from these bumps or sinus tracts. Bands of scarring often occur.  Treatment for hidradenitis suppurativa  Treatment for this skin disease is most successful when started early. But it may be hard to diagnose. It may be mistaken for other skin conditions. The painful bumps also often return. So stopping new bumps and limiting scarring is important. Treatment options include:  · Warm compress. Putting a warm, wet washcloth on the affected skin may help.  · Lifestyle changes. Your symptoms may get better if you lose weight or stop smoking, if needed. Also avoid shaving or other irritants, such as deodorant or perfume.  · Antibiotics. For mild cases, an antibiotic for the skin (topical) may help. You may need oral antibiotics if you have a severe case. They can help prevent further infection.  · Other oral medicines. Over-the-counter pain medicines can ease pain and inflammation. You may need stronger medicines for a severe case. These medicines include corticosteroids or a retinoid. These may cause side effects.  · Injected medicines. A steroid may be injected into the bump to ease pain. A biologic may be injected to ease severe symptoms.  · Surgery. Surgery can drain and remove the painful bumps. For severe cases, the doctor may cut out the entire area of affected skin or destroy it with a laser.  Complications of hidradenitis suppurativa  These include:  · Arthritis  · Depression  · Lymphedema  · Scarring of skin  · Skin cancer  When to call your healthcare provider  Call your healthcare provider right away if you have any of these:  · Fever of 100.4°F (38°C) or higher, or as  directed  · Redness, swelling, or fluid leaking from your rash that gets worse  · Pain that gets worse  · Symptoms that dont get better, or get worse  · New symptoms   Date Last Reviewed: 5/1/2016  © 3953-5073 [employer]. [address] [place] [address], [place] [address]. All rights reserved. This information is not intended as a substitute for professional medical care. Always follow your healthcare professional's instructions.

## 2020-11-27 NOTE — PROGRESS NOTES
Subjective:       Patient ID: Tee Shrestha is a 40 y.o. male.    Chief Complaint: Establish Care,  Finger pain, DM and Hidradenitis, finger pain, low back pain, HTN      History of Present Illness:   Tee Shrestha 40 y.o. male presents today with multiple complaints  He is looking to establish care.  Blood pressure was found to be elevated today. He has history of HTN.  Denies chest pain, palpitations, shortness of breath, dyspnea on exertion, left arm or neck pain, nausea, vomiting, diaphoresis, PND and orthopnea.  Nothing makes this worse or better. Has not taken his medication yet                 Fingernail  Pain x one week:  Right 3rd lateral nail cuticle is swollen, tender. He had his wife work on his ingrown nail before the sxs started.                 C/O Low back pain: right sided. 3 weeks ago. Sudden onset while he was getting out of the truck. Lasted for few minutes. Since then, pain has been reoccurring with certain twisting movement. Asso with stiffness. Rest helps. Denies trauma. Denies radiation to the legs.    C/O Hidradenitis flare up to his perineal/groin area x 2 weeks. Small boils and induration with spontaneous rupture and purulent drainage. Denies fever.    DM II, not controlled but improving A1C. Lab reviewed with pt.  Past Medical History:   Diagnosis Date    Abscess     GERD (gastroesophageal reflux disease)     Hypertension      Family History   Problem Relation Age of Onset    Hypertension Mother     Diabetes Mother     No Known Problems Father      Social History     Socioeconomic History    Marital status: Single     Spouse name: Not on file    Number of children: Not on file    Years of education: Not on file    Highest education level: Not on file   Occupational History    Not on file   Social Needs    Financial resource strain: Not on file    Food insecurity     Worry: Not on file     Inability: Not on file    Transportation needs     Medical:  "Not on file     Non-medical: Not on file   Tobacco Use    Smoking status: Current Every Day Smoker     Packs/day: 1.00     Types: Cigarettes    Smokeless tobacco: Never Used   Substance and Sexual Activity    Alcohol use: No    Drug use: No    Sexual activity: Not on file   Lifestyle    Physical activity     Days per week: Not on file     Minutes per session: Not on file    Stress: Not on file   Relationships    Social connections     Talks on phone: Not on file     Gets together: Not on file     Attends Yarsani service: Not on file     Active member of club or organization: Not on file     Attends meetings of clubs or organizations: Not on file     Relationship status: Not on file   Other Topics Concern    Not on file   Social History Narrative    Not on file     Outpatient Encounter Medications as of 11/27/2020   Medication Sig Dispense Refill    amLODIPine (NORVASC) 10 MG tablet Take 1 tablet (10 mg total) by mouth once daily. 30 tablet 5    blood sugar diagnostic Strp 1 each by Misc.(Non-Drug; Combo Route) route 3 (three) times daily. Insurance preferred 100 strip 11    blood-glucose meter kit Use as instructed. Insurance preferred 1 each 0    insulin detemir U-100 (LEVEMIR FLEXTOUCH) 100 unit/mL (3 mL) SubQ InPn pen Inject 50 Units into the skin.       insulin glargine, TOUJEO, (TOUJEO SOLOSTAR U-300 INSULIN) 300 unit/mL (1.5 mL) InPn pen Inject 50 Units into the skin every evening. To replace Levemir 15 mL 0    lancets Misc 1 each by Misc.(Non-Drug; Combo Route) route 3 (three) times daily. Insurance preferred 100 each 11    losartan (COZAAR) 100 MG tablet Take 1 tablet (100 mg total) by mouth once daily. 30 tablet 5    metFORMIN (GLUCOPHAGE) 1000 MG tablet Take 1,000 mg by mouth 2 (two) times daily with meals.      metFORMIN (GLUCOPHAGE-XR) 500 MG ER 24hr tablet Take 2 tablets (1,000 mg total) by mouth 2 (two) times daily with meals. 360 tablet 0    pen needle, diabetic 32 gauge x 5/32" " Ndle 1 each by Misc.(Non-Drug; Combo Route) route once daily. 100 each 11    pravastatin (PRAVACHOL) 40 MG tablet Take 40 mg by mouth once daily.      [DISCONTINUED] amLODIPine (NORVASC) 10 MG tablet Take 10 mg by mouth once daily.      [DISCONTINUED] amLODIPine (NORVASC) 5 MG tablet Take 5 mg by mouth.      [DISCONTINUED] ketorolac (TORADOL) 10 mg tablet Take 1 tablet (10 mg total) by mouth every 6 (six) hours. 14 tablet 0    [DISCONTINUED] losartan (COZAAR) 100 MG tablet Take 1 tablet (100 mg total) by mouth once daily. 30 tablet 0    cetirizine (ZYRTEC) 10 MG tablet Take 1 tablet (10 mg total) by mouth once daily. 30 tablet 0    diclofenac sodium (VOLTAREN) 1 % Gel Apply 2 g topically 4 (four) times daily. for 10 days 100 g 4    doxycycline (VIBRAMYCIN) 100 MG Cap Take 1 capsule (100 mg total) by mouth 2 (two) times daily. for 10 days 20 capsule 0    HYDROcodone-acetaminophen (NORCO) 7.5-325 mg per tablet Take 1 tablet by mouth every 6 (six) hours as needed for Pain. 10 tablet 0     No facility-administered encounter medications on file as of 11/27/2020.        Review of Systems   Constitutional: Negative for appetite change and fever.   HENT: Negative for congestion, facial swelling and voice change.    Eyes: Negative for discharge and itching.   Respiratory: Negative for cough, chest tightness and wheezing.    Cardiovascular: Negative.  Negative for chest pain and leg swelling.   Gastrointestinal: Negative for abdominal pain, nausea and vomiting.   Endocrine: Negative for cold intolerance and heat intolerance.   Genitourinary: Negative for dysuria and flank pain.   Musculoskeletal: Positive for back pain. Negative for gait problem, myalgias and neck stiffness.   Skin: Positive for wound. Negative for pallor and rash.   Neurological: Negative for facial asymmetry and weakness.   Psychiatric/Behavioral: Negative for agitation and confusion.       Objective:      BP (!) 152/100 (BP Location: Right arm)    "Pulse 78   Temp 98.1 °F (36.7 °C) (Tympanic)   Ht 6' 2" (1.88 m)   Wt (!) 166 kg (365 lb 15.4 oz)   SpO2 98%   BMI 46.99 kg/m²   Physical Exam  Vitals signs and nursing note reviewed.   Constitutional:       General: He is not in acute distress.     Appearance: Normal appearance. He is well-developed.   HENT:      Head: Normocephalic and atraumatic.      Right Ear: External ear normal.      Left Ear: External ear normal.   Eyes:      Conjunctiva/sclera: Conjunctivae normal.   Neck:      Musculoskeletal: Neck supple.   Cardiovascular:      Rate and Rhythm: Normal rate and regular rhythm.   Pulmonary:      Effort: Pulmonary effort is normal. No respiratory distress.   Abdominal:      General: Bowel sounds are normal.      Palpations: Abdomen is soft.   Genitourinary:     Comments: deferred  Musculoskeletal:      Lumbar back: He exhibits decreased range of motion, tenderness and pain.   Skin:     General: Skin is warm and dry.      Findings: Rash (groin and perineal-boil and scar cw hidradenitis) present.   Neurological:      Mental Status: He is alert and oriented to person, place, and time.   Psychiatric:         Behavior: Behavior normal.         Results for orders placed or performed in visit on 11/10/20   POCT Glucose, Hand-Held Device   Result Value Ref Range    POC Glucose 115 (A) 70 - 110 MG/DL     Assessment:       1. Essential hypertension    2. Chronic right-sided low back pain without sciatica    3. Hidradenitis suppurativa    4. Type 2 diabetes mellitus with other specified complication, unspecified whether long term insulin use    5. Paronychia of finger, right        Plan:   Essential hypertension  -     amLODIPine (NORVASC) 10 MG tablet; Take 1 tablet (10 mg total) by mouth once daily.  Dispense: 30 tablet; Refill: 5  -     losartan (COZAAR) 100 MG tablet; Take 1 tablet (100 mg total) by mouth once daily.  Dispense: 30 tablet; Refill: 5    Chronic right-sided low back pain without sciatica  -     " diclofenac sodium (VOLTAREN) 1 % Gel; Apply 2 g topically 4 (four) times daily. for 10 days  Dispense: 100 g; Refill: 4    Hidradenitis suppurativa  -     doxycycline (VIBRAMYCIN) 100 MG Cap; Take 1 capsule (100 mg total) by mouth 2 (two) times daily. for 10 days  Dispense: 20 capsule; Refill: 0    Type 2 diabetes mellitus with other specified complication, unspecified whether long term insulin use  -continue current med  Paronychia of finger, right    -healing, warm compress.

## 2020-12-04 ENCOUNTER — PATIENT OUTREACH (OUTPATIENT)
Dept: ADMINISTRATIVE | Facility: HOSPITAL | Age: 40
End: 2020-12-04

## 2020-12-04 NOTE — PROGRESS NOTES
Called [facility] in [place] and has never been seen by them. Spoke with [facility] and they report he has appt scheduled for Dec 31. Will follow up.

## 2020-12-11 ENCOUNTER — PATIENT MESSAGE (OUTPATIENT)
Dept: OTHER | Facility: OTHER | Age: 40
End: 2020-12-11

## 2020-12-21 ENCOUNTER — TELEPHONE (OUTPATIENT)
Dept: DIABETES | Facility: CLINIC | Age: 40
End: 2020-12-21

## 2020-12-21 NOTE — TELEPHONE ENCOUNTER
Spoke with pt regarding nurse visit. Advise pt to send his blood sugar logs in via [employer] for provider to review. He verbalized understanding.

## 2020-12-31 LAB
LEFT EYE DM RETINOPATHY: NEGATIVE
RIGHT EYE DM RETINOPATHY: NEGATIVE

## 2021-01-21 ENCOUNTER — PATIENT OUTREACH (OUTPATIENT)
Dept: ADMINISTRATIVE | Facility: HOSPITAL | Age: 41
End: 2021-01-21

## 2021-03-04 DIAGNOSIS — E11.9 NEW ONSET TYPE 2 DIABETES MELLITUS: ICD-10-CM

## 2021-03-05 DIAGNOSIS — E11.69 TYPE 2 DIABETES MELLITUS WITH OTHER SPECIFIED COMPLICATION, UNSPECIFIED WHETHER LONG TERM INSULIN USE: Primary | ICD-10-CM

## 2021-03-05 RX ORDER — INSULIN GLARGINE 300 U/ML
50 INJECTION, SOLUTION SUBCUTANEOUS NIGHTLY
Qty: 3 SYRINGE | Refills: 0 | Status: SHIPPED | OUTPATIENT
Start: 2021-03-05 | End: 2021-05-19

## 2021-03-05 RX ORDER — INSULIN GLARGINE 300 U/ML
50 INJECTION, SOLUTION SUBCUTANEOUS NIGHTLY
Qty: 13.5 SYRINGE | OUTPATIENT
Start: 2021-03-05 | End: 2021-06-03

## 2021-04-01 ENCOUNTER — LAB VISIT (OUTPATIENT)
Dept: LAB | Facility: HOSPITAL | Age: 41
End: 2021-04-01
Attending: FAMILY MEDICINE
Payer: MEDICAID

## 2021-04-01 DIAGNOSIS — E11.69 TYPE 2 DIABETES MELLITUS WITH OTHER SPECIFIED COMPLICATION, UNSPECIFIED WHETHER LONG TERM INSULIN USE: ICD-10-CM

## 2021-04-01 LAB
ALBUMIN SERPL BCP-MCNC: 3.2 G/DL (ref 3.5–5.2)
ALP SERPL-CCNC: 75 U/L (ref 55–135)
ALT SERPL W/O P-5'-P-CCNC: 10 U/L (ref 10–44)
ANION GAP SERPL CALC-SCNC: 6 MMOL/L (ref 8–16)
AST SERPL-CCNC: 12 U/L (ref 10–40)
BASOPHILS # BLD AUTO: 0.03 K/UL (ref 0–0.2)
BASOPHILS NFR BLD: 0.3 % (ref 0–1.9)
BILIRUB SERPL-MCNC: 0.9 MG/DL (ref 0.1–1)
BUN SERPL-MCNC: 10 MG/DL (ref 6–20)
CALCIUM SERPL-MCNC: 8.5 MG/DL (ref 8.7–10.5)
CHLORIDE SERPL-SCNC: 106 MMOL/L (ref 95–110)
CHOLEST SERPL-MCNC: 174 MG/DL (ref 120–199)
CHOLEST/HDLC SERPL: 4.8 {RATIO} (ref 2–5)
CO2 SERPL-SCNC: 27 MMOL/L (ref 23–29)
CREAT SERPL-MCNC: 0.9 MG/DL (ref 0.5–1.4)
DIFFERENTIAL METHOD: ABNORMAL
EOSINOPHIL # BLD AUTO: 0.3 K/UL (ref 0–0.5)
EOSINOPHIL NFR BLD: 3 % (ref 0–8)
ERYTHROCYTE [DISTWIDTH] IN BLOOD BY AUTOMATED COUNT: 15.4 % (ref 11.5–14.5)
EST. GFR  (AFRICAN AMERICAN): >60 ML/MIN/1.73 M^2
EST. GFR  (NON AFRICAN AMERICAN): >60 ML/MIN/1.73 M^2
ESTIMATED AVG GLUCOSE: 151 MG/DL (ref 68–131)
GLUCOSE SERPL-MCNC: 116 MG/DL (ref 70–110)
HBA1C MFR BLD: 6.9 % (ref 4–5.6)
HCT VFR BLD AUTO: 44.3 % (ref 40–54)
HDLC SERPL-MCNC: 36 MG/DL (ref 40–75)
HDLC SERPL: 20.7 % (ref 20–50)
HGB BLD-MCNC: 13.9 G/DL (ref 14–18)
IMM GRANULOCYTES # BLD AUTO: 0.09 K/UL (ref 0–0.04)
IMM GRANULOCYTES NFR BLD AUTO: 1 % (ref 0–0.5)
LDLC SERPL CALC-MCNC: 122.2 MG/DL (ref 63–159)
LYMPHOCYTES # BLD AUTO: 2 K/UL (ref 1–4.8)
LYMPHOCYTES NFR BLD: 23.2 % (ref 18–48)
MCH RBC QN AUTO: 28.3 PG (ref 27–31)
MCHC RBC AUTO-ENTMCNC: 31.4 G/DL (ref 32–36)
MCV RBC AUTO: 90 FL (ref 82–98)
MONOCYTES # BLD AUTO: 0.5 K/UL (ref 0.3–1)
MONOCYTES NFR BLD: 6 % (ref 4–15)
NEUTROPHILS # BLD AUTO: 5.8 K/UL (ref 1.8–7.7)
NEUTROPHILS NFR BLD: 66.5 % (ref 38–73)
NONHDLC SERPL-MCNC: 138 MG/DL
NRBC BLD-RTO: 0 /100 WBC
PLATELET # BLD AUTO: 156 K/UL (ref 150–450)
PMV BLD AUTO: ABNORMAL FL (ref 9.2–12.9)
POTASSIUM SERPL-SCNC: 3.8 MMOL/L (ref 3.5–5.1)
PROT SERPL-MCNC: 6.9 G/DL (ref 6–8.4)
RBC # BLD AUTO: 4.91 M/UL (ref 4.6–6.2)
SODIUM SERPL-SCNC: 139 MMOL/L (ref 136–145)
TRIGL SERPL-MCNC: 79 MG/DL (ref 30–150)
TSH SERPL DL<=0.005 MIU/L-ACNC: 1.48 UIU/ML (ref 0.4–4)
WBC # BLD AUTO: 8.7 K/UL (ref 3.9–12.7)

## 2021-04-01 PROCEDURE — 83036 HEMOGLOBIN GLYCOSYLATED A1C: CPT | Performed by: NURSE PRACTITIONER

## 2021-04-01 PROCEDURE — 85025 COMPLETE CBC W/AUTO DIFF WBC: CPT | Performed by: NURSE PRACTITIONER

## 2021-04-01 PROCEDURE — 80061 LIPID PANEL: CPT | Performed by: NURSE PRACTITIONER

## 2021-04-01 PROCEDURE — 84443 ASSAY THYROID STIM HORMONE: CPT | Performed by: NURSE PRACTITIONER

## 2021-04-01 PROCEDURE — 36415 COLL VENOUS BLD VENIPUNCTURE: CPT | Performed by: NURSE PRACTITIONER

## 2021-04-01 PROCEDURE — 80053 COMPREHEN METABOLIC PANEL: CPT | Performed by: NURSE PRACTITIONER

## 2021-04-05 ENCOUNTER — PATIENT MESSAGE (OUTPATIENT)
Dept: FAMILY MEDICINE | Facility: CLINIC | Age: 41
End: 2021-04-05

## 2021-04-14 ENCOUNTER — TELEPHONE (OUTPATIENT)
Dept: DIABETES | Facility: CLINIC | Age: 41
End: 2021-04-14

## 2021-04-14 ENCOUNTER — OFFICE VISIT (OUTPATIENT)
Dept: DIABETES | Facility: CLINIC | Age: 41
End: 2021-04-14
Payer: MEDICAID

## 2021-04-14 DIAGNOSIS — Z91.199 NO-SHOW FOR APPOINTMENT: Primary | ICD-10-CM

## 2021-04-14 PROCEDURE — 99499 UNLISTED E&M SERVICE: CPT | Mod: 95,,, | Performed by: NURSE PRACTITIONER

## 2021-04-14 PROCEDURE — 99499 NO LOS: ICD-10-PCS | Mod: 95,,, | Performed by: NURSE PRACTITIONER

## 2021-04-16 ENCOUNTER — TELEPHONE (OUTPATIENT)
Dept: DIABETES | Facility: CLINIC | Age: 41
End: 2021-04-16

## 2021-04-28 ENCOUNTER — PATIENT MESSAGE (OUTPATIENT)
Dept: RESEARCH | Facility: HOSPITAL | Age: 41
End: 2021-04-28

## 2021-05-19 ENCOUNTER — OFFICE VISIT (OUTPATIENT)
Dept: DIABETES | Facility: CLINIC | Age: 41
End: 2021-05-19
Payer: MEDICAID

## 2021-05-19 ENCOUNTER — TELEPHONE (OUTPATIENT)
Dept: DIABETES | Facility: CLINIC | Age: 41
End: 2021-05-19

## 2021-05-19 DIAGNOSIS — E11.69 TYPE 2 DIABETES MELLITUS WITH OTHER SPECIFIED COMPLICATION, UNSPECIFIED WHETHER LONG TERM INSULIN USE: Primary | ICD-10-CM

## 2021-05-19 PROCEDURE — 99211 PR OFFICE/OUTPT VISIT, EST, LEVL I: ICD-10-PCS | Mod: 95,,, | Performed by: NURSE PRACTITIONER

## 2021-05-19 PROCEDURE — 99211 OFF/OP EST MAY X REQ PHY/QHP: CPT | Mod: 95,,, | Performed by: NURSE PRACTITIONER

## 2021-05-19 RX ORDER — METFORMIN HYDROCHLORIDE 500 MG/1
1000 TABLET, EXTENDED RELEASE ORAL 2 TIMES DAILY WITH MEALS
Qty: 360 TABLET | Refills: 0 | Status: SHIPPED | OUTPATIENT
Start: 2021-05-19 | End: 2021-08-18 | Stop reason: SDUPTHER

## 2021-05-19 RX ORDER — PRAVASTATIN SODIUM 40 MG/1
40 TABLET ORAL DAILY
Qty: 30 TABLET | Refills: 2 | Status: SHIPPED | OUTPATIENT
Start: 2021-05-19 | End: 2021-08-16

## 2021-08-12 ENCOUNTER — TELEPHONE (OUTPATIENT)
Dept: DIABETES | Facility: CLINIC | Age: 41
End: 2021-08-12

## 2021-08-12 DIAGNOSIS — E11.69 TYPE 2 DIABETES MELLITUS WITH OTHER SPECIFIED COMPLICATION, UNSPECIFIED WHETHER LONG TERM INSULIN USE: ICD-10-CM

## 2021-08-14 DIAGNOSIS — E11.69 TYPE 2 DIABETES MELLITUS WITH OTHER SPECIFIED COMPLICATION, UNSPECIFIED WHETHER LONG TERM INSULIN USE: ICD-10-CM

## 2021-08-14 DIAGNOSIS — I10 ESSENTIAL HYPERTENSION: ICD-10-CM

## 2021-08-16 ENCOUNTER — TELEPHONE (OUTPATIENT)
Dept: PRIMARY CARE CLINIC | Facility: CLINIC | Age: 41
End: 2021-08-16

## 2021-08-16 RX ORDER — INSULIN GLARGINE 300 U/ML
50 INJECTION, SOLUTION SUBCUTANEOUS DAILY
Qty: 2 PEN | Refills: 2 | Status: SHIPPED | OUTPATIENT
Start: 2021-08-16 | End: 2021-08-24

## 2021-08-16 RX ORDER — AMLODIPINE BESYLATE 10 MG/1
TABLET ORAL
Qty: 30 TABLET | Refills: 0 | OUTPATIENT
Start: 2021-08-16

## 2021-08-16 RX ORDER — PRAVASTATIN SODIUM 40 MG/1
TABLET ORAL
Qty: 30 TABLET | Refills: 0 | Status: SHIPPED | OUTPATIENT
Start: 2021-08-16 | End: 2022-05-03 | Stop reason: SDUPTHER

## 2021-08-24 ENCOUNTER — OFFICE VISIT (OUTPATIENT)
Dept: DIABETES | Facility: CLINIC | Age: 41
End: 2021-08-24
Payer: MEDICAID

## 2021-08-24 VITALS — DIASTOLIC BLOOD PRESSURE: 102 MMHG | HEART RATE: 69 BPM | SYSTOLIC BLOOD PRESSURE: 152 MMHG

## 2021-08-24 DIAGNOSIS — E11.69 TYPE 2 DIABETES MELLITUS WITH OTHER SPECIFIED COMPLICATION, UNSPECIFIED WHETHER LONG TERM INSULIN USE: Primary | ICD-10-CM

## 2021-08-24 DIAGNOSIS — I10 ESSENTIAL HYPERTENSION: ICD-10-CM

## 2021-08-24 PROCEDURE — 99213 OFFICE O/P EST LOW 20 MIN: CPT | Mod: S$PBB,,, | Performed by: NURSE PRACTITIONER

## 2021-08-24 PROCEDURE — 99999 PR PBB SHADOW E&M-EST. PATIENT-LVL II: CPT | Mod: PBBFAC,,, | Performed by: NURSE PRACTITIONER

## 2021-08-24 PROCEDURE — 99213 PR OFFICE/OUTPT VISIT, EST, LEVL III, 20-29 MIN: ICD-10-PCS | Mod: S$PBB,,, | Performed by: NURSE PRACTITIONER

## 2021-08-24 PROCEDURE — 99212 OFFICE O/P EST SF 10 MIN: CPT | Mod: PBBFAC | Performed by: NURSE PRACTITIONER

## 2021-08-24 PROCEDURE — 99999 PR PBB SHADOW E&M-EST. PATIENT-LVL II: ICD-10-PCS | Mod: PBBFAC,,, | Performed by: NURSE PRACTITIONER

## 2021-08-24 RX ORDER — DULAGLUTIDE 0.75 MG/.5ML
0.75 INJECTION, SOLUTION SUBCUTANEOUS WEEKLY
Qty: 4 PEN | Refills: 0 | Status: SHIPPED | OUTPATIENT
Start: 2021-08-24 | End: 2021-09-15

## 2021-08-25 DIAGNOSIS — Z11.59 NEED FOR HEPATITIS C SCREENING TEST: ICD-10-CM

## 2021-09-15 ENCOUNTER — OFFICE VISIT (OUTPATIENT)
Dept: DIABETES | Facility: CLINIC | Age: 41
End: 2021-09-15
Payer: MEDICAID

## 2021-09-15 DIAGNOSIS — E11.69 TYPE 2 DIABETES MELLITUS WITH OTHER SPECIFIED COMPLICATION, UNSPECIFIED WHETHER LONG TERM INSULIN USE: Primary | ICD-10-CM

## 2021-09-15 PROCEDURE — 99212 OFFICE O/P EST SF 10 MIN: CPT | Mod: 95,,, | Performed by: NURSE PRACTITIONER

## 2021-09-15 PROCEDURE — 99212 PR OFFICE/OUTPT VISIT, EST, LEVL II, 10-19 MIN: ICD-10-PCS | Mod: 95,,, | Performed by: NURSE PRACTITIONER

## 2021-09-15 RX ORDER — DULAGLUTIDE 1.5 MG/.5ML
1.5 INJECTION, SOLUTION SUBCUTANEOUS
Qty: 4 PEN | Refills: 3 | Status: SHIPPED | OUTPATIENT
Start: 2021-09-15 | End: 2021-12-07 | Stop reason: SDUPTHER

## 2021-12-06 DIAGNOSIS — E11.69 TYPE 2 DIABETES MELLITUS WITH OTHER SPECIFIED COMPLICATION, UNSPECIFIED WHETHER LONG TERM INSULIN USE: ICD-10-CM

## 2021-12-07 RX ORDER — DULAGLUTIDE 1.5 MG/.5ML
1.5 INJECTION, SOLUTION SUBCUTANEOUS
Qty: 4 PEN | Refills: 1 | Status: SHIPPED | OUTPATIENT
Start: 2021-12-07 | End: 2022-01-03 | Stop reason: SDUPTHER

## 2021-12-07 RX ORDER — DULAGLUTIDE 0.75 MG/.5ML
0.75 INJECTION, SOLUTION SUBCUTANEOUS WEEKLY
OUTPATIENT
Start: 2021-12-07

## 2022-01-03 DIAGNOSIS — E11.69 TYPE 2 DIABETES MELLITUS WITH OTHER SPECIFIED COMPLICATION, UNSPECIFIED WHETHER LONG TERM INSULIN USE: ICD-10-CM

## 2022-01-03 RX ORDER — DULAGLUTIDE 1.5 MG/.5ML
1.5 INJECTION, SOLUTION SUBCUTANEOUS
Qty: 4 PEN | Refills: 1 | Status: SHIPPED | OUTPATIENT
Start: 2022-01-03 | End: 2022-04-22 | Stop reason: SDUPTHER

## 2022-01-03 NOTE — TELEPHONE ENCOUNTER
----- Message from Nishi Byrne sent at 1/3/2022  1:47 PM CST -----  Contact: JASON RUIZ [[id number]]  .Type:  RX Refill Request    Who Called: JASON RUIZ [[id number]]  Refill or New Rx:Refill  RX Name and Strength:TRULICITY 1.5 mg  How is the patient currently taking it? (ex. Day):Once a week  Is this a 30 day or 90 day RX: 90 day   Preferred Pharmacy with phone number:.  [facility]/pharmacy #[id number] - [place], [place] - [address]  [address] [address]  Phone: [phone] Fax: [phone]     Local or Mail Order:local  Ordering Provider: Nai   Would the patient rather a call back or a response via [facility]? Call   Best Call Back Number:.[phone] ([place])    Additional Information: Pt is requesting a new rx because the old rx has  and a new on is needed in order for him to get

## 2022-01-14 ENCOUNTER — PATIENT OUTREACH (OUTPATIENT)
Dept: ADMINISTRATIVE | Facility: HOSPITAL | Age: 42
End: 2022-01-14
Payer: MEDICAID

## 2022-01-18 ENCOUNTER — TELEPHONE (OUTPATIENT)
Dept: DIABETES | Facility: CLINIC | Age: 42
End: 2022-01-18
Payer: MEDICAID

## 2022-01-18 NOTE — TELEPHONE ENCOUNTER
Called pt regarding missed labs- needed prior to virtual visit tomorrow, no answer, left a voicemail

## 2022-01-19 ENCOUNTER — TELEPHONE (OUTPATIENT)
Dept: DIABETES | Facility: CLINIC | Age: 42
End: 2022-01-19
Payer: MEDICAID

## 2022-03-23 ENCOUNTER — HOSPITAL ENCOUNTER (EMERGENCY)
Facility: HOSPITAL | Age: 42
Discharge: HOME | End: 2022-03-23
Attending: EMERGENCY MEDICINE
Payer: MEDICAID

## 2022-03-23 VITALS
RESPIRATION RATE: 20 BRPM | BODY MASS INDEX: 40.43 KG/M2 | OXYGEN SATURATION: 95 % | DIASTOLIC BLOOD PRESSURE: 107 MMHG | TEMPERATURE: 98 F | HEART RATE: 87 BPM | SYSTOLIC BLOOD PRESSURE: 185 MMHG | WEIGHT: 315 LBS | HEIGHT: 74 IN

## 2022-03-23 DIAGNOSIS — M25.561 BILATERAL KNEE PAIN: Primary | ICD-10-CM

## 2022-03-23 DIAGNOSIS — M25.562 BILATERAL KNEE PAIN: Primary | ICD-10-CM

## 2022-03-23 PROCEDURE — 99283 EMERGENCY DEPT VISIT LOW MDM: CPT

## 2022-03-23 RX ORDER — NAPROXEN 500 MG/1
500 TABLET ORAL 2 TIMES DAILY WITH MEALS
Qty: 60 TABLET | Refills: 0 | OUTPATIENT
Start: 2022-03-23 | End: 2022-09-22

## 2022-03-23 NOTE — Clinical Note
"Tee Duartekeshia Shrestha was seen and treated in our emergency department on 3/23/2022.  He may return to work on 03/24/2022.       If you have any questions or concerns, please don't hesitate to call.      Pal Deal Jr., FNP"

## 2022-03-23 NOTE — ED PROVIDER NOTES
Encounter Date: 3/23/2022       History     Chief Complaint   Patient presents with    Knee Pain     States knees are jumping out of place for 2 mos     The history is provided by the patient.   41-year-old male presents to the emergency department with complaints of bilateral knee pain.  Patient states that the right knee hurts worse than the left but both feel unstable and painful with walking.  Patient denies any specific injury but reports when he stepped out of the truck today he felt a pop in his knee.  Patient currently denies any other symptoms.    Review of patient's allergies indicates:   Allergen Reactions    Hydrocodone-acetaminophen Hives and Itching     Past Medical History:   Diagnosis Date    Abscess     GERD (gastroesophageal reflux disease)     Hypertension      No past surgical history on file.  Family History   Problem Relation Age of Onset    Hypertension Mother     Diabetes Mother     No Known Problems Father      Social History     Tobacco Use    Smoking status: Current Every Day Smoker     Packs/day: 1.00     Types: Cigarettes    Smokeless tobacco: Never Used   Substance Use Topics    Alcohol use: No    Drug use: No     Review of Systems   Constitutional: Negative for fever.   HENT: Negative for sore throat.    Respiratory: Negative for shortness of breath.    Cardiovascular: Negative for chest pain.   Gastrointestinal: Negative for nausea.   Genitourinary: Negative for dysuria.   Musculoskeletal: Positive for arthralgias. Negative for back pain.   Skin: Negative for rash.   Neurological: Negative for weakness.   Hematological: Does not bruise/bleed easily.   All other systems reviewed and are negative.      Physical Exam     Initial Vitals [03/23/22 1437]   BP Pulse Resp Temp SpO2   (!) 185/107 87 20 98 °F (36.7 °C) 95 %      MAP       --         Physical Exam    Constitutional: He appears well-developed and well-nourished. He is Obese . No distress.   HENT:   Head: Normocephalic  and atraumatic.   Nose: Nose normal.   Mouth/Throat: Uvula is midline and oropharynx is clear and moist.   Eyes: Conjunctivae and EOM are normal. Pupils are equal, round, and reactive to light.   Neck: Neck supple.   Normal range of motion.  Cardiovascular: Normal rate and regular rhythm.   Pulmonary/Chest: Effort normal and breath sounds normal. No respiratory distress. He has no decreased breath sounds. He has no wheezes. He has no rales.   Abdominal: Abdomen is soft. Bowel sounds are normal. There is no abdominal tenderness.   Musculoskeletal:         General: Normal range of motion.      Cervical back: Normal range of motion and neck supple.     Neurological: He is alert and oriented to person, place, and time. He has normal strength. GCS eye subscore is 4. GCS verbal subscore is 5. GCS motor subscore is 6.   Skin: Skin is warm and dry. Capillary refill takes less than 2 seconds. No rash noted.   Psychiatric: He has a normal mood and affect. His speech is normal and behavior is normal.         ED Course   Procedures  Labs Reviewed - No data to display       Imaging Results          X-Ray Knee Complete 4 or more Views Bilat (Final result)  Result time 03/23/22 15:14:20   Procedure changed from X-Ray Knee 3 View Bilateral     Final result by RONNIE Miles Sr., MD (03/23/22 15:14:20)                 Impression:      1. There are small joint effusions in both knees.  2. There is a small spur at the site of attachment of the quadriceps tendon to the patella in the right knee.      Electronically signed by: Preet Miles MD  Date:    03/23/2022  Time:    15:14             Narrative:    EXAMINATION:  XR KNEE COMP 4 OR MORE VIEWS BILAT    CLINICAL HISTORY:  Pain in right kneebilat knee pain ;    COMPARISON:  A plain film examination of the right knee performed on 10/16/2016    FINDINGS:  There is no fracture. There is no dislocation.  There are small joint effusions in both knees.  There is a small spur at the site  of attachment of the quadriceps tendon to the patella in the right knee.                                 Medications - No data to display       I discussed with patient and/or family/caretaker that negative X-ray does not rule out occult fracture or other soft tissue injury.  Persistent pain greater than 7-10 days or increased pain requires follow up, specifically with orthopedics.                  Clinical Impression:   Final diagnoses:  [M25.561, M25.562] Bilateral knee pain (Primary)          ED Disposition Condition    Discharge Stable        ED Prescriptions     Medication Sig Dispense Start Date End Date Auth. Provider    naproxen (NAPROSYN) 500 MG tablet Take 1 tablet (500 mg total) by mouth 2 (two) times daily with meals. 60 tablet 3/23/2022  Pal Deal Jr., FNP        Follow-up Information     Follow up With Specialties Details Why Contact Info    [facility] - Emergency Dept. Emergency Medicine  If symptoms worsen [address] [address] [phone]           Pal Deal Jr., SUMAYAP  03/23/22 4551

## 2022-04-22 DIAGNOSIS — E11.69 TYPE 2 DIABETES MELLITUS WITH OTHER SPECIFIED COMPLICATION, UNSPECIFIED WHETHER LONG TERM INSULIN USE: ICD-10-CM

## 2022-04-22 RX ORDER — DULAGLUTIDE 1.5 MG/.5ML
1.5 INJECTION, SOLUTION SUBCUTANEOUS
Qty: 4 PEN | Refills: 1 | Status: SHIPPED | OUTPATIENT
Start: 2022-04-22 | End: 2022-05-03 | Stop reason: SDUPTHER

## 2022-04-22 NOTE — TELEPHONE ENCOUNTER
Please be advise. Attempt to contact Patient to inform him that [facility] has been routed to provider and to contact his Primary for other medication refill request.    ----- Message from Maddie Hahn sent at 4/22/2022 11:01 AM CDT -----  Contact: pt  Who Called: PT  Regarding: The pt is requesting a refill to be called in for his naproxen (NAPROSYN) 500 MG tablet, dulaglutide (TRULICITY) 1.5 mg/0.5 mL pen injector, losartan (COZAAR) 100 MG tablet be called into the pharmacy. He is requesting a callback.   Would the patient rather a call back or a response via [facility]? Call back  Best Call Back Number: [phone]  Additional Information:

## 2022-05-03 ENCOUNTER — OFFICE VISIT (OUTPATIENT)
Dept: DIABETES | Facility: CLINIC | Age: 42
End: 2022-05-03
Payer: MEDICAID

## 2022-05-03 ENCOUNTER — LAB VISIT (OUTPATIENT)
Dept: LAB | Facility: HOSPITAL | Age: 42
End: 2022-05-03
Attending: NURSE PRACTITIONER
Payer: MEDICAID

## 2022-05-03 VITALS
WEIGHT: 315 LBS | HEIGHT: 74 IN | SYSTOLIC BLOOD PRESSURE: 132 MMHG | BODY MASS INDEX: 40.43 KG/M2 | DIASTOLIC BLOOD PRESSURE: 88 MMHG

## 2022-05-03 DIAGNOSIS — E11.69 TYPE 2 DIABETES MELLITUS WITH OTHER SPECIFIED COMPLICATION, UNSPECIFIED WHETHER LONG TERM INSULIN USE: ICD-10-CM

## 2022-05-03 DIAGNOSIS — E11.69 TYPE 2 DIABETES MELLITUS WITH OTHER SPECIFIED COMPLICATION, UNSPECIFIED WHETHER LONG TERM INSULIN USE: Primary | ICD-10-CM

## 2022-05-03 DIAGNOSIS — I10 ESSENTIAL HYPERTENSION: ICD-10-CM

## 2022-05-03 LAB — GLUCOSE SERPL-MCNC: 123 MG/DL (ref 70–110)

## 2022-05-03 PROCEDURE — 1160F RVW MEDS BY RX/DR IN RCRD: CPT | Mod: CPTII,,, | Performed by: NURSE PRACTITIONER

## 2022-05-03 PROCEDURE — 1159F PR MEDICATION LIST DOCUMENTED IN MEDICAL RECORD: ICD-10-PCS | Mod: CPTII,,, | Performed by: NURSE PRACTITIONER

## 2022-05-03 PROCEDURE — 4010F PR ACE/ARB THEARPY RXD/TAKEN: ICD-10-PCS | Mod: CPTII,,, | Performed by: NURSE PRACTITIONER

## 2022-05-03 PROCEDURE — 4010F ACE/ARB THERAPY RXD/TAKEN: CPT | Mod: CPTII,,, | Performed by: NURSE PRACTITIONER

## 2022-05-03 PROCEDURE — 3079F DIAST BP 80-89 MM HG: CPT | Mod: CPTII,,, | Performed by: NURSE PRACTITIONER

## 2022-05-03 PROCEDURE — 3075F PR MOST RECENT SYSTOLIC BLOOD PRESS GE 130-139MM HG: ICD-10-PCS | Mod: CPTII,,, | Performed by: NURSE PRACTITIONER

## 2022-05-03 PROCEDURE — 99213 OFFICE O/P EST LOW 20 MIN: CPT | Mod: PBBFAC | Performed by: NURSE PRACTITIONER

## 2022-05-03 PROCEDURE — 1159F MED LIST DOCD IN RCRD: CPT | Mod: CPTII,,, | Performed by: NURSE PRACTITIONER

## 2022-05-03 PROCEDURE — 1160F PR REVIEW ALL MEDS BY PRESCRIBER/CLIN PHARMACIST DOCUMENTED: ICD-10-PCS | Mod: CPTII,,, | Performed by: NURSE PRACTITIONER

## 2022-05-03 PROCEDURE — 99999 PR PBB SHADOW E&M-EST. PATIENT-LVL III: CPT | Mod: PBBFAC,,, | Performed by: NURSE PRACTITIONER

## 2022-05-03 PROCEDURE — 82962 GLUCOSE BLOOD TEST: CPT | Mod: PBBFAC | Performed by: NURSE PRACTITIONER

## 2022-05-03 PROCEDURE — 3079F PR MOST RECENT DIASTOLIC BLOOD PRESSURE 80-89 MM HG: ICD-10-PCS | Mod: CPTII,,, | Performed by: NURSE PRACTITIONER

## 2022-05-03 PROCEDURE — 99999 PR PBB SHADOW E&M-EST. PATIENT-LVL III: ICD-10-PCS | Mod: PBBFAC,,, | Performed by: NURSE PRACTITIONER

## 2022-05-03 PROCEDURE — 99214 PR OFFICE/OUTPT VISIT, EST, LEVL IV, 30-39 MIN: ICD-10-PCS | Mod: S$PBB,,, | Performed by: NURSE PRACTITIONER

## 2022-05-03 PROCEDURE — 3008F BODY MASS INDEX DOCD: CPT | Mod: CPTII,,, | Performed by: NURSE PRACTITIONER

## 2022-05-03 PROCEDURE — 3075F SYST BP GE 130 - 139MM HG: CPT | Mod: CPTII,,, | Performed by: NURSE PRACTITIONER

## 2022-05-03 PROCEDURE — 36415 COLL VENOUS BLD VENIPUNCTURE: CPT | Performed by: NURSE PRACTITIONER

## 2022-05-03 PROCEDURE — 3008F PR BODY MASS INDEX (BMI) DOCUMENTED: ICD-10-PCS | Mod: CPTII,,, | Performed by: NURSE PRACTITIONER

## 2022-05-03 PROCEDURE — 83036 HEMOGLOBIN GLYCOSYLATED A1C: CPT | Performed by: NURSE PRACTITIONER

## 2022-05-03 PROCEDURE — 99214 OFFICE O/P EST MOD 30 MIN: CPT | Mod: S$PBB,,, | Performed by: NURSE PRACTITIONER

## 2022-05-03 RX ORDER — PRAVASTATIN SODIUM 40 MG/1
40 TABLET ORAL DAILY
Qty: 90 TABLET | Refills: 1 | Status: SHIPPED | OUTPATIENT
Start: 2022-05-03 | End: 2023-09-11 | Stop reason: SDUPTHER

## 2022-05-03 RX ORDER — DULAGLUTIDE 1.5 MG/.5ML
1.5 INJECTION, SOLUTION SUBCUTANEOUS
Qty: 12 PEN | Refills: 1 | Status: SHIPPED | OUTPATIENT
Start: 2022-05-03 | End: 2022-12-01

## 2022-05-03 RX ORDER — METFORMIN HYDROCHLORIDE 500 MG/1
1000 TABLET, EXTENDED RELEASE ORAL 2 TIMES DAILY WITH MEALS
Qty: 360 TABLET | Refills: 1 | Status: SHIPPED | OUTPATIENT
Start: 2022-05-03 | End: 2022-12-01 | Stop reason: SDUPTHER

## 2022-05-03 RX ORDER — LOSARTAN POTASSIUM 100 MG/1
100 TABLET ORAL DAILY
Qty: 90 TABLET | Refills: 1 | Status: SHIPPED | OUTPATIENT
Start: 2022-05-03 | End: 2023-09-11 | Stop reason: SDUPTHER

## 2022-05-03 NOTE — PATIENT INSTRUCTIONS
PATIENT INSTRUCTIONS  - Follow up as scheduled.   - Carb Count: 30-45G per meal and 15G per snack  - Exercise: Goal is 150 minutes or more per week  - Bring meter and blood sugar log to each appointment.     Medication instructions:   - continue medication    - Blood Sugar Goals:  1. The goal for fasting blood sugars is 80 -130 mg/dl.   2. The goal for the 2 hour after meal blood sugars is below 180 mg/dl.  3. Blood sugars below 70 are considered LOW and you must eat or drink 15G of carbohydrates immediately, then recheck blood sugar after 15 minutes to ensure blood sugar has returned to normal range, (70 or above)

## 2022-05-03 NOTE — PROGRESS NOTES
"Subjective:         Patient ID: Tee Shrestha is a 41 y.o. male.  Patient's current PCP is [facility].       Chief Complaint: Diabetes Mellitus    HPI  Tee Shrestha is a 41 y.o. Black or  male presenting for a follow up for diabetes. Patient has had DM for < 5 y.   He has the following complications related to diabetes:  HTN, Hyperlipidemia. Past failed treatment include: none.  Blood glucose testing is performed. BG have been <120s fasting per pt recall. Very active at work      Height: 6' 2" (188 cm)  //  Weight: (!) 159.5 kg (351 lb 10.1 oz), Body mass index is 45.15 kg/m².  His blood sugar in clinic today is:   Lab Results   Component Value Date    POCGLU 115 (A) 11/10/2020       Labs reviewed and are noted below.  His most recent A1C is:  Lab Results   Component Value Date    HGBA1C 9.5 (H) 08/18/2021    HGBA1C 6.9 (H) 04/01/2021    HGBA1C 9.3 (H) 10/13/2020     No results found for: CPEPTIDE  No results found for: GLUTAMICACID  Glucose   Date Value Ref Range Status   04/01/2021 116 (H) 70 - 110 mg/dL Final     Anion Gap   Date Value Ref Range Status   04/01/2021 6 (L) 8 - 16 mmol/L Final     eGFR if    Date Value Ref Range Status   04/01/2021 >60.0 >60 mL/min/1.73 m^2 Final     eGFR if non    Date Value Ref Range Status   04/01/2021 >60.0 >60 mL/min/1.73 m^2 Final     Comment:     Calculation used to obtain the estimated glomerular filtration  rate (eGFR) is the CKD-EPI equation.            CURRENT DM MEDICATIONS:   Diabetes Medications             dulaglutide (TRULICITY) 1.5 mg/0.5 mL pen injector Inject 1.5 mg into the skin every 7 days.    metFORMIN (GLUCOPHAGE-XR) 500 MG ER 24hr tablet TAKE 2 TABLETS BY MOUTH TWICE A DAY WITH MEALS        Diabetes Management Status    Statin: Not taking  ACE/ARB: Taking    Screening or Prevention Patient's value Goal Complete/Controlled?   HgA1C Testing and Control   Lab " Results   Component Value Date    HGBA1C 9.5 (H) 08/18/2021      Annually/Less than 8% No   Lipid profile : 08/18/2021 Annually Yes   LDL control Lab Results   Component Value Date    LDLCALC 122.2 04/01/2021    Annually/Less than 100 mg/dl  No   Nephropathy screening Lab Results   Component Value Date    LABMICR 15.0 04/01/2021     Lab Results   Component Value Date    PROTEINUA Negative 03/07/2020    Annually Yes   Blood pressure BP Readings from Last 1 Encounters:   05/03/22 132/88    Less than 140/90 No   Dilated retinal exam : 12/31/2020 Annually No   Foot exam   : 08/24/2021 Annually No       Review of Systems   Constitutional: Negative for activity change, appetite change and fatigue.   HENT: Negative for sore throat, trouble swallowing and voice change.    Respiratory: Negative for chest tightness and shortness of breath.    Cardiovascular: Negative for chest pain and palpitations.   Gastrointestinal: Negative for constipation and diarrhea.   Endocrine: Negative for polydipsia, polyphagia and polyuria.   Neurological: Negative for syncope and weakness.   Psychiatric/Behavioral: Negative for confusion.         Objective:      Physical Exam  Constitutional:       General: He is not in acute distress.     Appearance: He is well-developed. He is not ill-appearing, toxic-appearing or diaphoretic.   Neck:      Thyroid: No thyroid mass.   Musculoskeletal:         General: Normal range of motion.      Cervical back: Normal range of motion.   Skin:     General: Skin is warm and dry.   Neurological:      Mental Status: He is alert and oriented to person, place, and time.   Psychiatric:         Behavior: Behavior normal.         Thought Content: Thought content normal.         Judgment: Judgment normal.         Assessment:       1. Type 2 diabetes mellitus with other specified complication, unspecified whether long term insulin use        Plan:   Type 2 diabetes mellitus with other specified complication, unspecified  whether long term insulin use  -     POCT Glucose, Hand-Held Device  -     Microalbumin/Creatinine Ratio, Urine; Future; Expected date: 05/03/2022  -     Hemoglobin A1C; Future; Expected date: 05/03/2022        PLAN:   - Condition: uncontrolled    - Monitor blood glucose 2x daily, fasting and ac dinner or at bedtime. Goals reviewed  - Diet reviewed   - Medication Changes:  Continue Metformin and Trulicity   - He understands the importance of routine foot and eye exams- EVELIA to be signed  - The patient was explained the above plan and given opportunity to ask questions.  He understands, chooses and consents to this plan and accepts all the risks, which include but are not limited to the risks mentioned above.   - Labs - reviewed  - Nurse visit: deferred  - Follow up:  3 months    A total of 30 minutes was spent in face to face time, of which over 50% was spent in counseling patient on disease process, complications, treatment, and side effects of medications.

## 2022-05-04 LAB
ESTIMATED AVG GLUCOSE: 117 MG/DL (ref 68–131)
HBA1C MFR BLD: 5.7 % (ref 4–5.6)

## 2022-05-11 ENCOUNTER — PATIENT OUTREACH (OUTPATIENT)
Dept: ADMINISTRATIVE | Facility: HOSPITAL | Age: 42
End: 2022-05-11
Payer: MEDICAID

## 2022-05-11 NOTE — PROGRESS NOTES
Uploaded EVELIA EYE EXAM 5/3/2022 ; faxed to [facility] to request. Remind me set 1 week.

## 2022-05-26 ENCOUNTER — TELEPHONE (OUTPATIENT)
Dept: DIABETES | Facility: CLINIC | Age: 42
End: 2022-05-26
Payer: MEDICAID

## 2022-05-26 NOTE — TELEPHONE ENCOUNTER
Labs reviewed with patient.       ----- Message from Derrick Londono sent at 5/26/2022  7:17 AM CDT -----  Type:  Test Results    Who Called:  Tee  Name of Test (Lab/Mammo/Etc):  lab  Date of Test:  5-  Ordering Provider:  Jessy Trimble  Where the test was performed:  [facility], Lab  Would the patient rather a call back or a response via [facility]?  Call back  Best Call Back Number: [phone]  Additional Information:  no

## 2022-06-13 ENCOUNTER — TELEPHONE (OUTPATIENT)
Dept: DIABETES | Facility: CLINIC | Age: 42
End: 2022-06-13
Payer: MEDICAID

## 2022-06-13 RX ORDER — GABAPENTIN 300 MG/1
300 CAPSULE ORAL 3 TIMES DAILY
Qty: 90 CAPSULE | Refills: 1 | Status: SHIPPED | OUTPATIENT
Start: 2022-06-13 | End: 2022-10-05

## 2022-06-13 RX ORDER — GABAPENTIN 300 MG/1
1 CAPSULE ORAL 3 TIMES DAILY
COMMUNITY
Start: 2021-12-08 | End: 2022-06-13 | Stop reason: SDUPTHER

## 2022-07-19 ENCOUNTER — TELEPHONE (OUTPATIENT)
Dept: DIABETES | Facility: CLINIC | Age: 42
End: 2022-07-19
Payer: MEDICAID

## 2022-09-22 ENCOUNTER — HOSPITAL ENCOUNTER (EMERGENCY)
Facility: HOSPITAL | Age: 42
Discharge: HOME | End: 2022-09-22
Attending: EMERGENCY MEDICINE
Payer: MEDICAID

## 2022-09-22 VITALS
HEART RATE: 74 BPM | OXYGEN SATURATION: 96 % | DIASTOLIC BLOOD PRESSURE: 79 MMHG | RESPIRATION RATE: 18 BRPM | SYSTOLIC BLOOD PRESSURE: 158 MMHG

## 2022-09-22 DIAGNOSIS — S39.012D BACK STRAIN, SUBSEQUENT ENCOUNTER: ICD-10-CM

## 2022-09-22 DIAGNOSIS — I10 PRIMARY HYPERTENSION: ICD-10-CM

## 2022-09-22 DIAGNOSIS — R42 DIZZINESS: Primary | ICD-10-CM

## 2022-09-22 DIAGNOSIS — I10 ESSENTIAL HYPERTENSION: ICD-10-CM
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
## 2022-09-22 PROCEDURE — 99284 EMERGENCY DEPT VISIT MOD MDM: CPT | Mod: 25

## 2022-09-22 PROCEDURE — 25000003 PHARM REV CODE 250: Performed by: EMERGENCY MEDICINE

## 2022-09-22 RX ORDER — TRAMADOL HYDROCHLORIDE 50 MG/1
50 TABLET ORAL
Status: COMPLETED | OUTPATIENT
Start: 2022-09-22 | End: 2022-09-22

## 2022-09-22 RX ORDER — AMLODIPINE BESYLATE 10 MG/1
10 TABLET ORAL DAILY
Qty: 30 TABLET | Refills: 0 | Status: SHIPPED | OUTPATIENT
Start: 2022-09-22 | End: 2023-09-22

## 2022-09-22 RX ORDER — NAPROXEN 500 MG/1
500 TABLET ORAL 2 TIMES DAILY WITH MEALS
Qty: 20 TABLET | Refills: 0 | Status: SHIPPED | OUTPATIENT
Start: 2022-09-22

## 2022-09-22 RX ORDER — CYCLOBENZAPRINE HCL 10 MG
10 TABLET ORAL
Status: COMPLETED | OUTPATIENT
Start: 2022-09-22 | End: 2022-09-22

## 2022-09-22 RX ORDER — CYCLOBENZAPRINE HCL 10 MG
10 TABLET ORAL 3 TIMES DAILY PRN
Qty: 9 TABLET | Refills: 0 | Status: SHIPPED | OUTPATIENT
Start: 2022-09-22 | End: 2022-09-27

## 2022-09-22 RX ORDER — NAPROXEN 500 MG/1
500 TABLET ORAL
Status: COMPLETED | OUTPATIENT
Start: 2022-09-22 | End: 2022-09-22

## 2022-09-22 RX ORDER — AMLODIPINE BESYLATE 10 MG/1
10 TABLET ORAL DAILY
Qty: 30 TABLET | Refills: 5 | Status: SHIPPED | OUTPATIENT
Start: 2022-09-22 | End: 2022-10-22

## 2022-09-22 RX ORDER — TRAMADOL HYDROCHLORIDE 50 MG/1
50 TABLET ORAL EVERY 6 HOURS PRN
Qty: 12 TABLET | Refills: 0 | Status: SHIPPED | OUTPATIENT
Start: 2022-09-22

## 2022-09-22 RX ORDER — AMLODIPINE BESYLATE 5 MG/1
10 TABLET ORAL
Status: COMPLETED | OUTPATIENT
Start: 2022-09-22 | End: 2022-09-22

## 2022-09-22 RX ADMIN — TRAMADOL HYDROCHLORIDE 50 MG: 50 TABLET, COATED ORAL at 01:09

## 2022-09-22 RX ADMIN — AMLODIPINE BESYLATE 10 MG: 5 TABLET ORAL at 01:09

## 2022-09-22 RX ADMIN — CYCLOBENZAPRINE HYDROCHLORIDE 10 MG: 10 TABLET, FILM COATED ORAL at 01:09

## 2022-09-22 RX ADMIN — NAPROXEN 500 MG: 500 TABLET ORAL at 01:09
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
## 2022-11-25 DIAGNOSIS — E11.69 TYPE 2 DIABETES MELLITUS WITH OTHER SPECIFIED COMPLICATION, UNSPECIFIED WHETHER LONG TERM INSULIN USE: Primary | ICD-10-CM

## 2022-11-25 RX ORDER — INSULIN GLARGINE 300 U/ML
52 INJECTION, SOLUTION SUBCUTANEOUS DAILY
Qty: 2 PEN | Refills: 0 | Status: SHIPPED | OUTPATIENT
Start: 2022-11-25 | End: 2022-12-05 | Stop reason: SDUPTHER

## 2022-11-25 NOTE — TELEPHONE ENCOUNTER
"Covering for Jessy's inbasket. Patient called by staff with regards to BG concern and received this message.    "Pt states his sugar level is 539 as of right now and he took 2 Metformin already and is need of Insulin. He took 1 pill at nine and took 2 pills 2hrs ago and it seems to keep going up."    Patient called with regards to above concern. Advised patient he needs to be seen in the ER for evaluation and management. Per patient, he was previously on Toujeo 52 units daily. He stopped taking Toujeo when he was switched to Trulicity approximately 5-6 months ago. Instructed patient to resume Toujeo 52 units after being seen in the ER. Will send RX for Toujeo to local pharmacy.     Will schedule follow up with Jessy.  "

## 2022-12-01 ENCOUNTER — TELEPHONE (OUTPATIENT)
Dept: DIABETES | Facility: CLINIC | Age: 42
End: 2022-12-01
Payer: MEDICAID

## 2022-12-01 DIAGNOSIS — E11.69 TYPE 2 DIABETES MELLITUS WITH OTHER SPECIFIED COMPLICATION, UNSPECIFIED WHETHER LONG TERM INSULIN USE: Primary | ICD-10-CM

## 2022-12-01 RX ORDER — METFORMIN HYDROCHLORIDE 500 MG/1
1000 TABLET, EXTENDED RELEASE ORAL 2 TIMES DAILY WITH MEALS
Qty: 360 TABLET | Refills: 0 | Status: SHIPPED | OUTPATIENT
Start: 2022-12-01 | End: 2022-12-05 | Stop reason: SDUPTHER

## 2022-12-01 RX ORDER — DULAGLUTIDE 3 MG/.5ML
3 INJECTION, SOLUTION SUBCUTANEOUS
Qty: 4 PEN | Refills: 3 | Status: SHIPPED | OUTPATIENT
Start: 2022-12-01 | End: 2022-12-05 | Stop reason: SDUPTHER

## 2022-12-01 NOTE — TELEPHONE ENCOUNTER
Attempted to contact pt:  Review BG and any symptoms  Make sure he picked up the insulin    Left a message to call back
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
## 2022-12-01 NOTE — TELEPHONE ENCOUNTER
Spoke w/ pt and scheduled in person f/u, but pt is concerned about what he should do about his med refills until his next visit. He also wants to know if there is anything else he can take since Trulicity and Metformin aren't working for him.

## 2022-12-05 DIAGNOSIS — E11.69 TYPE 2 DIABETES MELLITUS WITH OTHER SPECIFIED COMPLICATION, UNSPECIFIED WHETHER LONG TERM INSULIN USE: ICD-10-CM

## 2022-12-05 RX ORDER — INSULIN GLARGINE 300 U/ML
52 INJECTION, SOLUTION SUBCUTANEOUS DAILY
Qty: 2 PEN | Refills: 0 | Status: SHIPPED | OUTPATIENT
Start: 2022-12-05 | End: 2023-01-03 | Stop reason: SDUPTHER

## 2022-12-05 RX ORDER — METFORMIN HYDROCHLORIDE 500 MG/1
1000 TABLET, EXTENDED RELEASE ORAL 2 TIMES DAILY WITH MEALS
Qty: 360 TABLET | Refills: 0 | Status: SHIPPED | OUTPATIENT
Start: 2022-12-05 | End: 2022-12-13

## 2022-12-05 RX ORDER — DULAGLUTIDE 3 MG/.5ML
3 INJECTION, SOLUTION SUBCUTANEOUS
Qty: 4 PEN | Refills: 0 | Status: SHIPPED | OUTPATIENT
Start: 2022-12-05 | End: 2022-12-13

## 2022-12-05 NOTE — TELEPHONE ENCOUNTER
Spoke w/ pt and gave him provider's orders. Pt requested his meds be sent to [facility] on [address].

## 2023-01-03 ENCOUNTER — OFFICE VISIT (OUTPATIENT)
Dept: DIABETES | Facility: CLINIC | Age: 43
End: 2023-01-03
Payer: MEDICAID

## 2023-01-03 VITALS
WEIGHT: 306.44 LBS | HEART RATE: 102 BPM | HEIGHT: 74 IN | SYSTOLIC BLOOD PRESSURE: 132 MMHG | BODY MASS INDEX: 39.33 KG/M2 | DIASTOLIC BLOOD PRESSURE: 81 MMHG

## 2023-01-03 DIAGNOSIS — Z72.0 TOBACCO USE: ICD-10-CM

## 2023-01-03 DIAGNOSIS — I10 ESSENTIAL HYPERTENSION: ICD-10-CM

## 2023-01-03 DIAGNOSIS — Z11.59 NEED FOR HEPATITIS C SCREENING TEST: ICD-10-CM

## 2023-01-03 DIAGNOSIS — E11.69 TYPE 2 DIABETES MELLITUS WITH OTHER SPECIFIED COMPLICATION, UNSPECIFIED WHETHER LONG TERM INSULIN USE: Primary | ICD-10-CM

## 2023-01-03 DIAGNOSIS — E66.01 MORBID OBESITY: ICD-10-CM

## 2023-01-03 LAB — GLUCOSE SERPL-MCNC: 500 MG/DL (ref 70–110)

## 2023-01-03 PROCEDURE — 82962 GLUCOSE BLOOD TEST: CPT | Mod: PBBFAC | Performed by: NURSE PRACTITIONER

## 2023-01-03 PROCEDURE — 99999 PR PBB SHADOW E&M-EST. PATIENT-LVL III: ICD-10-PCS | Mod: PBBFAC,,, | Performed by: NURSE PRACTITIONER

## 2023-01-03 PROCEDURE — 99214 OFFICE O/P EST MOD 30 MIN: CPT | Mod: S$PBB,,, | Performed by: NURSE PRACTITIONER

## 2023-01-03 PROCEDURE — 3008F BODY MASS INDEX DOCD: CPT | Mod: CPTII,,, | Performed by: NURSE PRACTITIONER

## 2023-01-03 PROCEDURE — 3008F PR BODY MASS INDEX (BMI) DOCUMENTED: ICD-10-PCS | Mod: CPTII,,, | Performed by: NURSE PRACTITIONER

## 2023-01-03 PROCEDURE — 99999 PR PBB SHADOW E&M-EST. PATIENT-LVL III: CPT | Mod: PBBFAC,,, | Performed by: NURSE PRACTITIONER

## 2023-01-03 PROCEDURE — 1159F MED LIST DOCD IN RCRD: CPT | Mod: CPTII,,, | Performed by: NURSE PRACTITIONER

## 2023-01-03 PROCEDURE — 3079F PR MOST RECENT DIASTOLIC BLOOD PRESSURE 80-89 MM HG: ICD-10-PCS | Mod: CPTII,,, | Performed by: NURSE PRACTITIONER

## 2023-01-03 PROCEDURE — 3079F DIAST BP 80-89 MM HG: CPT | Mod: CPTII,,, | Performed by: NURSE PRACTITIONER

## 2023-01-03 PROCEDURE — 1159F PR MEDICATION LIST DOCUMENTED IN MEDICAL RECORD: ICD-10-PCS | Mod: CPTII,,, | Performed by: NURSE PRACTITIONER

## 2023-01-03 PROCEDURE — 99213 OFFICE O/P EST LOW 20 MIN: CPT | Mod: PBBFAC | Performed by: NURSE PRACTITIONER

## 2023-01-03 PROCEDURE — 3075F SYST BP GE 130 - 139MM HG: CPT | Mod: CPTII,,, | Performed by: NURSE PRACTITIONER

## 2023-01-03 PROCEDURE — 3075F PR MOST RECENT SYSTOLIC BLOOD PRESS GE 130-139MM HG: ICD-10-PCS | Mod: CPTII,,, | Performed by: NURSE PRACTITIONER

## 2023-01-03 PROCEDURE — 99214 PR OFFICE/OUTPT VISIT, EST, LEVL IV, 30-39 MIN: ICD-10-PCS | Mod: S$PBB,,, | Performed by: NURSE PRACTITIONER

## 2023-01-03 RX ORDER — INSULIN GLARGINE 300 U/ML
52 INJECTION, SOLUTION SUBCUTANEOUS DAILY
Qty: 6 PEN | Refills: 0 | Status: SHIPPED | OUTPATIENT
Start: 2023-01-03 | End: 2023-04-03

## 2023-01-03 NOTE — PROGRESS NOTES
Tee Shrestha is a 42 y.o. male who  has a past medical history of Abscess, Diabetes mellitus, GERD (gastroesophageal reflux disease), and Hypertension., who present for an initial evaluation of Type 2 diabetes mellitus.     CHIEF COMPLAINT: Diabetes Consultation    [facility]: [facility]     The patient was initially diagnosed with diabetes 2 years ago.   Previously followed by Jessy Trimble NP, last visit 5/2022.     Previous failed treatments include:  None.    Social Documentation:  Patient lives in [place].  Occupation: , cement mixers, locally.   Exercise: lifting weights, walking.     Current monitoring regimen: capillary blood glucose monitoring with finger sticks. Typically running 80s when he has Toujeo.     Current Diabetes related symptoms/problems include hyperglycemia and have been unchanged.   Diabetes related complications:   none.   denies Pancreatitis  denies Gastroparesis  denies DKA  denies Hx/family Hx of MEN2/MTC  denies Frequent UTIs/yeast infections    Cardiovascular Risk Factors: dyslipidemia, hypertension, male gender, obesity (BMI >30 kg/m2), and smoking/tobacco exposure.    Any episodes of hypoglycemia? No.   Hypoglycemia Unawareness? no  Severe hypoglycemia requiring 3rd party? no  Seen by Diabetes Education in last year? no    Diabetes Medications               dulaglutide (TRULICITY) 3 mg/0.5 mL pen injector INJECT 3 MG INTO THE SKIN EVERY 7 DAYS    insulin glargine U-300 conc (TOUJEO MAX U-300 SOLOSTAR) 300 unit/mL (3 mL) insulin pen Inject 52 Units into the skin once daily. - last dose 2 weeks ago.     metFORMIN (GLUCOPHAGE-XR) 500 MG ER 24hr tablet Take 2 tablets (1,000 mg total) by mouth 2 (two) times daily with meals.           The patient's blood glucose in clinic today:   Lab Results   Component Value Date    POCGLU 500 (A) 01/03/2023        DIABETES DISEASE MANAGEMENT STATUS  Statin: Taking  ACE/ARB: Taking  Screening or Prevention  Patient's value Goal Complete/Controlled?   HgA1C Testing and Control   Lab Results   Component Value Date    HGBA1C 5.7 (H) 05/03/2022      Annually/Less than 8% Yes     Lipid profile : 08/18/2021 Annually No     LDL control Lab Results   Component Value Date    LDLCALC 122.2 04/01/2021    Annually/Less than 100 mg/dl  No     Nephropathy screening Lab Results   Component Value Date    LABMICR 18.0 05/03/2022     Lab Results   Component Value Date    PROTEINUA Negative 03/07/2020     No results found for: UTPCR   Annually Yes     Blood pressure BP Readings from Last 1 Encounters:   01/03/23 132/81    Less than 140/90 No     Dilated retinal exam : 12/31/2020 Annually No     Foot exam   : 08/24/2021 Annually No     Patient's medications, allergies, past medical, surgical, social and family histories were reviewed and updated as appropriate.     Review of Systems   Constitutional:  Negative for weight loss.   Eyes:  Negative for blurred vision and double vision.   Cardiovascular:  Negative for chest pain.   Gastrointestinal:  Negative for nausea and vomiting.   Genitourinary:  Negative for frequency.   Musculoskeletal:  Negative for falls.   Neurological:  Negative for dizziness and weakness.   Endo/Heme/Allergies:  Negative for polydipsia.   Psychiatric/Behavioral:  Negative for depression.    All other systems reviewed and are negative.     Physical Exam  Constitutional:       General: He is not in acute distress.     Appearance: Normal appearance.   Eyes:      Extraocular Movements: Extraocular movements intact.      Pupils: Pupils are equal, round, and reactive to light.   Cardiovascular:      Rate and Rhythm: Normal rate and regular rhythm.      Heart sounds: Normal heart sounds.   Pulmonary:      Effort: Pulmonary effort is normal. No respiratory distress.      Breath sounds: Normal breath sounds.   Musculoskeletal:      Cervical back: Normal range of motion and neck supple.   Neurological:      Mental Status: He  "is alert and oriented to person, place, and time.   Psychiatric:         Mood and Affect: Mood normal.         Behavior: Behavior normal.        Blood pressure 132/81, pulse 102, height 6' 2" (1.88 m), weight (!) 139 kg (306 lb 7 oz).  Wt Readings from Last 3 Encounters:   01/03/23 (!) 139 kg (306 lb 7 oz)   05/03/22 (!) 159.5 kg (351 lb 10.1 oz)   03/23/22 (!) 159.8 kg (352 lb 4.7 oz)       LAB REVIEW  Lab Results   Component Value Date     04/01/2021    K 3.8 04/01/2021     04/01/2021    CO2 27 04/01/2021    BUN 10 04/01/2021    CREATININE 0.9 04/01/2021    CALCIUM 8.5 (L) 04/01/2021    ANIONGAP 6 (L) 04/01/2021     No results found for: CPEPTIDE, IYL52NA, INSLNABS  Hemoglobin A1C   Date Value Ref Range Status   05/03/2022 5.7 (H) 4.0 - 5.6 % Final     Comment:     ADA Screening Guidelines:  5.7-6.4%  Consistent with prediabetes  >or=6.5%  Consistent with diabetes    High levels of fetal hemoglobin interfere with the HbA1C  assay. Heterozygous hemoglobin variants (HbS, HgC, etc)do  not significantly interfere with this assay.   However, presence of multiple variants may affect accuracy.     08/18/2021 9.5 (H) 4.8 - 5.6 % Final     Comment:              Prediabetes: 5.7 - 6.4           Diabetes: >6.4           Glycemic control for adults with diabetes: <7.0   04/01/2021 6.9 (H) 4.0 - 5.6 % Final     Comment:     ADA Screening Guidelines:  5.7-6.4%  Consistent with prediabetes  >or=6.5%  Consistent with diabetes    High levels of fetal hemoglobin interfere with the HbA1C  assay. Heterozygous hemoglobin variants (HbS, HgC, etc)do  not significantly interfere with this assay.   However, presence of multiple variants may affect accuracy.     10/13/2020 9.3 (H) 4.0 - 5.6 % Final     Comment:     ADA Screening Guidelines:  5.7-6.4%  Consistent with prediabetes  >or=6.5%  Consistent with diabetes  High levels of fetal hemoglobin interfere with the HbA1C  assay. Heterozygous hemoglobin variants (HbS, HgC, " etc)do  not significantly interfere with this assay.   However, presence of multiple variants may affect accuracy.         ASSESSMENT    ICD-10-CM ICD-9-CM   1. Type 2 diabetes mellitus with other specified complication, unspecified whether long term insulin use  E11.69 250.80   2. Essential hypertension  I10 401.9   3. Morbid obesity  E66.01 278.01   4. Tobacco use  Z72.0 305.1   5. Need for hepatitis C screening test  Z11.59 V73.89        PLAN  Diagnoses and all orders for this visit:    Type 2 diabetes mellitus with other specified complication, unspecified whether long term insulin use  -     POCT Glucose, Hand-Held Device  -     Hemoglobin A1C; Future  -     Comprehensive Metabolic Panel; Future  -     Lipid Panel; Future  -     insulin glargine U-300 conc (TOUJEO MAX U-300 SOLOSTAR) 300 unit/mL (3 mL) insulin pen; Inject 52 Units into the skin once daily.  -     Ambulatory referral/consult to Podiatry; Future    Essential hypertension    Morbid obesity    Tobacco use    Need for hepatitis C screening test  -     HEPATITIS C ANTIBODY; Future        Reviewed pathophysiology of diabetes, complications related to the disease, importance of annual dilated eye exam and daily foot examination. Explained MOA, SE, dosage of medications. Written instructions given and reviewed with patient and patient verbalizes understanding.     PATIENT INSTRUCTIONS    Diabetic foot exam performed today.  Fasting labs to be done 1 week prior to follow up appointment with me.   Continue Trulicity 3 mg subcutaneously weekly.   Continue Glucophage XR 1000 mg by mouth twice daily.   Continue Toujeo 52 units subcutaneously daily.   Instructed to increase water intake today, recheck BG at 7 pm. If still 500 or greater patient to report to emergency department.   Refer to podiatry for diabetic foot/nail care.   Check blood sugar twice daily. Every morning when you wake up and 1-2 hours after main meal of the day. Keep log and upload to  Val prior to each visit.   Blood Sugar Goals:       Fastin-130.       1-2 hours after a meal: Less than 180.     Follow up in about 4 weeks (around 2023) for IN-PERSON VISIT - O'VAR[facility], FASTING LABS 1 WEEK PRIOR TO APPOINTMENT.    Portions of this note were prepared with [employer] Naturally Speaking voice recognition transcription software. Grammatical errors, including garbled syntax, mangle pronouns, and other bizarre constructions may be attributed to that software system.

## 2023-01-03 NOTE — PATIENT INSTRUCTIONS
PATIENT INSTRUCTIONS    Diabetic foot exam performed today.  Fasting labs to be done 1 week prior to follow up appointment with me.   Continue Trulicity 3 mg subcutaneously weekly.   Continue Glucophage XR 1000 mg by mouth twice daily.   Continue Toujeo 52 units subcutaneously daily.   Instructed to increase water intake today, recheck BG at 7 pm. If still 500 or greater patient to report to emergency department.   Refer to podiatry for diabetic foot/nail care.   Check blood sugar twice daily. Every morning when you wake up and 1-2 hours after main meal of the day. Keep log and upload to [facility] prior to each visit.   Blood Sugar Goals:       Fastin-130.       1-2 hours after a meal: Less than 180.

## 2023-01-30 ENCOUNTER — TELEPHONE (OUTPATIENT)
Dept: DIABETES | Facility: CLINIC | Age: 43
End: 2023-01-30
Payer: MEDICAID

## 2023-03-31 ENCOUNTER — HOSPITAL ENCOUNTER (EMERGENCY)
Facility: HOSPITAL | Age: 43
Discharge: HOME | End: 2023-03-31
Payer: MEDICAID

## 2023-03-31 VITALS
TEMPERATURE: 99 F | OXYGEN SATURATION: 99 % | BODY MASS INDEX: 40.43 KG/M2 | SYSTOLIC BLOOD PRESSURE: 165 MMHG | HEIGHT: 74 IN | HEART RATE: 74 BPM | DIASTOLIC BLOOD PRESSURE: 93 MMHG | RESPIRATION RATE: 20 BRPM | WEIGHT: 315 LBS

## 2023-03-31 DIAGNOSIS — R07.9 CHEST PAIN: ICD-10-CM

## 2023-03-31 PROCEDURE — 93005 ELECTROCARDIOGRAM TRACING: CPT

## 2023-03-31 PROCEDURE — 93010 ELECTROCARDIOGRAM REPORT: CPT | Mod: ,,, | Performed by: INTERNAL MEDICINE

## 2023-03-31 PROCEDURE — 93010 EKG 12-LEAD: ICD-10-PCS | Mod: ,,, | Performed by: INTERNAL MEDICINE

## 2023-03-31 PROCEDURE — 99284 EMERGENCY DEPT VISIT MOD MDM: CPT | Mod: 25

## 2023-07-28 ENCOUNTER — TELEPHONE (OUTPATIENT)
Dept: PODIATRY | Facility: CLINIC | Age: 43
End: 2023-07-28
Payer: MEDICAID

## 2023-08-31 ENCOUNTER — TELEPHONE (OUTPATIENT)
Dept: PODIATRY | Facility: CLINIC | Age: 43
End: 2023-08-31
Payer: MEDICAID

## 2023-09-06 ENCOUNTER — TELEPHONE (OUTPATIENT)
Dept: DIABETES | Facility: CLINIC | Age: 43
End: 2023-09-06
Payer: MEDICAID

## 2023-09-06 NOTE — TELEPHONE ENCOUNTER
Pt called regarding rx request for mounjaro Pt hasnt been seen in 6 months, pt scheduled soonest available virtual to follow up with provider on 09/11/2023 @4:00pm. Pt voiced understanding of appt date and time.  ----- Message from Mor Osman sent at 9/5/2023  7:17 PM CDT -----  Contact: JASON RUIZ [[id number]]  .Type:  RX Refill Request    Who Called: JASON RUIZ [[id number]]  Refill or New Rx refill  RX Name and Strength:manjaro  How is the patient currently taking it? (ex. 1XDay  Is this a 30 day or 90 day RX  Preferred Pharmacy with phone number  Local or Mail Order:     [id number] [address]  [address] [facility] [address]  Phone: [phone] Fax: [phone]    Ordering Provide  Would the patient rather a call back or a response via [facility] both  Best Call Back Number [phone]   Additional Information          .Type:  RX Refill Request    Who Called: JASON RUIZ [[id number]]  Refill or New Rx refill  RX Name and Strength: toujeo  How is the patient currently taking it? (ex. 1XDay  Is this a 30 day or 90 day RX  Preferred Pharmacy with phone number  Local or Mail Order:     [id number] [address]  [address] [facility] [address]  Phone: [phone] Fax: [phone]    Ordering Provide  Would the patient rather a call back or a response via [facility] both  Best Call Back Number [phone]   Additional Information

## 2023-09-11 DIAGNOSIS — E11.9 NEW ONSET TYPE 2 DIABETES MELLITUS: ICD-10-CM

## 2023-09-11 DIAGNOSIS — I10 ESSENTIAL HYPERTENSION: ICD-10-CM

## 2023-09-11 DIAGNOSIS — E11.69 TYPE 2 DIABETES MELLITUS WITH OTHER SPECIFIED COMPLICATION, UNSPECIFIED WHETHER LONG TERM INSULIN USE: ICD-10-CM

## 2023-09-12 RX ORDER — PRAVASTATIN SODIUM 40 MG/1
40 TABLET ORAL DAILY
Qty: 90 TABLET | Refills: 1 | Status: SHIPPED | OUTPATIENT
Start: 2023-09-12

## 2023-09-12 RX ORDER — LOSARTAN POTASSIUM 100 MG/1
100 TABLET ORAL DAILY
Qty: 90 TABLET | Refills: 1 | Status: SHIPPED | OUTPATIENT
Start: 2023-09-12 | End: 2024-09-11

## 2023-09-12 RX ORDER — METFORMIN HYDROCHLORIDE 500 MG/1
1000 TABLET, EXTENDED RELEASE ORAL 2 TIMES DAILY WITH MEALS
Qty: 120 TABLET | Refills: 0 | Status: SHIPPED | OUTPATIENT
Start: 2023-09-12 | End: 2023-10-11

## 2023-09-12 RX ORDER — INSULIN PUMP SYRINGE, 3 ML
EACH MISCELLANEOUS
Qty: 1 EACH | Refills: 0 | Status: SHIPPED | OUTPATIENT
Start: 2023-09-12

## 2023-09-12 RX ORDER — DULAGLUTIDE 1.5 MG/.5ML
1.5 INJECTION, SOLUTION SUBCUTANEOUS
Qty: 4 PEN | Refills: 0 | Status: SHIPPED | OUTPATIENT
Start: 2023-09-12 | End: 2023-09-14 | Stop reason: SDUPTHER

## 2023-09-12 RX ORDER — LANCETS
1 EACH MISCELLANEOUS 3 TIMES DAILY
Qty: 100 EACH | Refills: 11 | Status: SHIPPED | OUTPATIENT
Start: 2023-09-12

## 2023-09-12 RX ORDER — GABAPENTIN 300 MG/1
300 CAPSULE ORAL 3 TIMES DAILY
Qty: 90 CAPSULE | Refills: 0 | Status: SHIPPED | OUTPATIENT
Start: 2023-09-12 | End: 2023-10-11

## 2023-09-14 RX ORDER — DULAGLUTIDE 1.5 MG/.5ML
1.5 INJECTION, SOLUTION SUBCUTANEOUS
Qty: 4 PEN | Refills: 0 | Status: SHIPPED | OUTPATIENT
Start: 2023-09-14 | End: 2023-10-14

## 2023-10-11 DIAGNOSIS — E11.69 TYPE 2 DIABETES MELLITUS WITH OTHER SPECIFIED COMPLICATION, UNSPECIFIED WHETHER LONG TERM INSULIN USE: ICD-10-CM

## 2023-10-11 RX ORDER — GABAPENTIN 300 MG/1
300 CAPSULE ORAL 3 TIMES DAILY
Qty: 90 CAPSULE | Refills: 0 | Status: SHIPPED | OUTPATIENT
Start: 2023-10-11

## 2023-10-11 RX ORDER — METFORMIN HYDROCHLORIDE 500 MG/1
1000 TABLET, EXTENDED RELEASE ORAL 2 TIMES DAILY WITH MEALS
Qty: 120 TABLET | Refills: 0 | Status: SHIPPED | OUTPATIENT
Start: 2023-10-11 | End: 2023-11-10

## 2023-11-10 ENCOUNTER — TELEPHONE (OUTPATIENT)
Dept: DIABETES | Facility: CLINIC | Age: 43
End: 2023-11-10
Payer: MEDICAID

## 2023-11-10 DIAGNOSIS — E11.69 TYPE 2 DIABETES MELLITUS WITH OTHER SPECIFIED COMPLICATION, UNSPECIFIED WHETHER LONG TERM INSULIN USE: ICD-10-CM

## 2023-11-10 RX ORDER — METFORMIN HYDROCHLORIDE 500 MG/1
1000 TABLET, EXTENDED RELEASE ORAL 2 TIMES DAILY WITH MEALS
Qty: 120 TABLET | Refills: 0 | Status: SHIPPED | OUTPATIENT
Start: 2023-11-10 | End: 2023-12-08

## 2023-11-10 NOTE — TELEPHONE ENCOUNTER
Courtesy refill of Metformin given for coverage of NOREEN Hicks. Patient needs follow up scheduled with provider please.     Maikol Ramirez PA-C  Diabetes Management

## 2023-11-10 NOTE — TELEPHONE ENCOUNTER
Contacted pt to notify him that Maikol approved a courtesy refill for Metformin on the behalf of Annetta. Notified him that he's due for a f/u w/ Annetta. Successfully scheduled an appt for pt pa/ Scott. Pt acknowledged and understood.

## 2023-12-08 DIAGNOSIS — E11.69 TYPE 2 DIABETES MELLITUS WITH OTHER SPECIFIED COMPLICATION, UNSPECIFIED WHETHER LONG TERM INSULIN USE: ICD-10-CM

## 2023-12-08 RX ORDER — METFORMIN HYDROCHLORIDE 500 MG/1
1000 TABLET, EXTENDED RELEASE ORAL 2 TIMES DAILY WITH MEALS
Qty: 120 TABLET | Refills: 0 | Status: SHIPPED | OUTPATIENT
Start: 2023-12-08

## 2024-03-21 ENCOUNTER — HOSPITAL ENCOUNTER (EMERGENCY)
Facility: HOSPITAL | Age: 44
Discharge: HOME | End: 2024-03-21
Attending: EMERGENCY MEDICINE
Payer: MEDICAID

## 2024-03-21 VITALS
RESPIRATION RATE: 18 BRPM | OXYGEN SATURATION: 98 % | WEIGHT: 315 LBS | BODY MASS INDEX: 40.43 KG/M2 | HEART RATE: 72 BPM | DIASTOLIC BLOOD PRESSURE: 100 MMHG | TEMPERATURE: 98 F | SYSTOLIC BLOOD PRESSURE: 179 MMHG | HEIGHT: 74 IN

## 2024-03-21 DIAGNOSIS — M54.50 ACUTE LEFT-SIDED LOW BACK PAIN, UNSPECIFIED WHETHER SCIATICA PRESENT: Primary | ICD-10-CM

## 2024-03-21 PROCEDURE — 99283 EMERGENCY DEPT VISIT LOW MDM: CPT

## 2024-03-21 RX ORDER — METHOCARBAMOL 750 MG/1
1500 TABLET, FILM COATED ORAL 3 TIMES DAILY
Qty: 30 TABLET | Refills: 0 | Status: SHIPPED | OUTPATIENT
Start: 2024-03-21 | End: 2024-03-26

## 2024-03-21 NOTE — Clinical Note
"Tee Georgechuy Shrestha was seen and treated in our emergency department on 3/21/2024.  He may return to work on 03/22/2024.       If you have any questions or concerns, please don't hesitate to call.      Licha Rosenberg, AMINA"

## 2024-03-21 NOTE — ED PROVIDER NOTES
History      Chief Complaint   Patient presents with    Back Pain     Pt reports left lower lateral lumbar pain x 1 week. Pain rating 8/10. Pt not sure how it happened, he does a lot of lifting. Pain worse when at rest.        Review of patient's allergies indicates:   Allergen Reactions    Hydrocodone-acetaminophen Hives and Itching        HPI   HPI    3/21/2024, 2:05 PM   History obtained from the patient      History of Present Illness: Tee Shrestha is a 43 y.o. male patient who presents to the Emergency Department for left low back pain.   Denies injury, bladder/bowel dysfunction, fever, saddle anesthesia, or focal weakness.           PCP: [facility]       Past Medical History:  Past Medical History:   Diagnosis Date    Abscess     Diabetes mellitus     GERD (gastroesophageal reflux disease)     Hypertension          Past Surgical History:  No past surgical history on file.        Family History:  Family History   Problem Relation Age of Onset    Hypertension Mother     Diabetes Mother     No Known Problems Father            Social History:  Social History     Tobacco Use    Smoking status: Every Day     Current packs/day: 1.00     Types: Cigarettes    Smokeless tobacco: Never   Substance and Sexual Activity    Alcohol use: No    Drug use: No    Sexual activity: Not on file       ROS   Review of Systems   Constitutional: Negative for chills and fever.   Musculoskeletal: Positive for back pain.   Neurological: Negative for weakness and numbness.   Review of Systems    Physical Exam      Initial Vitals [03/21/24 1400]   BP Pulse Resp Temp SpO2   (!) 179/100 72 18 97.9 °F (36.6 °C) 98 %      MAP       --         Physical Exam  Vital signs and nursing notes reviewed.  Constitutional: Patient is in NAD. Awake and alert. Well-developed and well-nourished.  Head: Atraumatic. Normocephalic.  Eyes: PERRL. EOM intact. Conjunctivae nl.  ENT: Mucous membranes are moist.   Neck:  "Supple.   Cardiovascular: Regular rate and rhythm. No murmurs, rubs, or gallops.   Pulmonary/Chest: No respiratory distress. Clear to auscultation bilaterally.   Abdominal: Soft. Non-distended. No TTP. No rebound, guarding, or rigidity.   Genitourinary: No CVA tenderness  Musculoskeletal: Moves all extremities. No edema.   Non tender c/t spine.  Lumbar spine with mild bilateral paraspinous ttp, no midline ttp or step.  Skin: Warm and dry.  Neurological: Awake and alert. No acute focal neurological deficits are appreciated.  5/5 x 4 strength.  Strong and equal foot plantar and dorsiflexion.  Psychiatric: Normal affect. Good eye contact. Appropriate in content.      ED Course      Procedures  ED Vital Signs:  Vitals:    03/21/24 1400   BP: (!) 179/100   Pulse: 72   Resp: 18   Temp: 97.9 °F (36.6 °C)   TempSrc: Oral   SpO2: 98%   Weight: (!) 147.5 kg (325 lb 2.9 oz)   Height: 6' 2" (1.88 m)                 Imaging Results:  Imaging Results    None            The Emergency Provider reviewed the vital signs and test results, which are outlined above.    ED Discussion         Medication(s) given in the ER:  Medications - No data to display         Follow-up Information       [facility] In 2 days.    Contact information:  [address] [facility] [address] [phone]                                 New Prescriptions    METHOCARBAMOL (ROBAXIN) 750 MG TAB    Take 2 tablets (1,500 mg total) by mouth 3 (three) times daily. for 5 days          Medical Decision Making      Blood pressure is elevated.  Pt denies cp, sob, ha, dizziness, vision change.  Findings were reviewed with the patient/family in detail.   All remaining questions and concerns were addressed at that time.  Patient/family has been counseled regarding the need for follow-up as well as the indication to return to the emergency room should new or worrisome developments occur.          MDM                 Clinical Impression:        " ICD-10-CM ICD-9-CM   1. Acute left-sided low back pain, unspecified whether sciatica present  M54.50 724.2               Licha Rosenberg, PAYOUSIF  03/21/24 1411

## 2024-09-24 DIAGNOSIS — E11.9 NEW ONSET TYPE 2 DIABETES MELLITUS: ICD-10-CM

## 2024-09-24 RX ORDER — CALCIUM CITRATE/VITAMIN D3 200MG-6.25
TABLET ORAL 3 TIMES DAILY
Qty: 100 STRIP | Refills: 11 | Status: SHIPPED | OUTPATIENT
Start: 2024-09-24